# Patient Record
Sex: FEMALE | Race: WHITE | Employment: FULL TIME | URBAN - METROPOLITAN AREA
[De-identification: names, ages, dates, MRNs, and addresses within clinical notes are randomized per-mention and may not be internally consistent; named-entity substitution may affect disease eponyms.]

---

## 2018-09-26 ENCOUNTER — OFFICE VISIT (OUTPATIENT)
Dept: FAMILY MEDICINE CLINIC | Age: 33
End: 2018-09-26

## 2018-09-26 VITALS
WEIGHT: 124.8 LBS | TEMPERATURE: 98.3 F | BODY MASS INDEX: 22.97 KG/M2 | DIASTOLIC BLOOD PRESSURE: 66 MMHG | HEART RATE: 66 BPM | RESPIRATION RATE: 12 BRPM | HEIGHT: 62 IN | SYSTOLIC BLOOD PRESSURE: 99 MMHG | OXYGEN SATURATION: 97 %

## 2018-09-26 DIAGNOSIS — D49.7 OPTIC NERVE TUMOR: ICD-10-CM

## 2018-09-26 DIAGNOSIS — G44.219 FREQUENT EPISODIC TENSION-TYPE HEADACHE: Primary | ICD-10-CM

## 2018-09-26 PROBLEM — L70.9 ACNE: Status: ACTIVE | Noted: 2018-09-26

## 2018-09-26 RX ORDER — NAPROXEN SODIUM 220 MG
440 TABLET ORAL 2 TIMES DAILY WITH MEALS
Qty: 30 TAB | Refills: 0
Start: 2018-09-26 | End: 2022-08-05

## 2018-09-26 RX ORDER — IBUPROFEN 200 MG
TABLET ORAL
COMMUNITY
End: 2018-09-26 | Stop reason: ALTCHOICE

## 2018-09-26 RX ORDER — SPIRONOLACTONE 100 MG/1
TABLET, FILM COATED ORAL DAILY
COMMUNITY
End: 2022-08-05

## 2018-09-26 NOTE — PATIENT INSTRUCTIONS
Tension Headache: Care Instructions  Your Care Instructions  Most headaches are tension headaches. These headaches tend to happen again, especially if you are under stress. A tension headache may cause pain or a feeling of pressure all over your head. You probably can't pinpoint the center of the pain. If you keep getting tension headaches, the best thing you can do to limit them is to find out what is causing them and then make changes in those areas. Follow-up care is a key part of your treatment and safety. Be sure to make and go to all appointments, and call your doctor if you are having problems. It's also a good idea to know your test results and keep a list of the medicines you take. How can you care for yourself at home? · Rest in a quiet, dark room with a cool cloth on your forehead until your headache is gone. Close your eyes, and try to relax or go to sleep. Don't watch TV or read. Avoid using the computer. · Use a warm, moist towel or a heating pad set on low to relax tight shoulder and neck muscles. · Have someone gently massage your neck and shoulders. · Take pain medicines exactly as directed. ¨ If the doctor gave you a prescription medicine for pain, take it as prescribed. ¨ If you are not taking a prescription pain medicine, ask your doctor if you can take an over-the-counter medicine. · Be careful not to take pain medicine more often than the instructions allow, because you may get worse or more frequent headaches when the medicine wears off. · If you get another tension headache, stop what you are doing and sit quietly for a moment. Close your eyes and breathe slowly. Try to relax your head and neck muscles. · Do not ignore new symptoms that occur with a headache, such as fever, weakness or numbness, vision changes, or confusion. These may be signs of a more serious problem. To help prevent headaches  · Keep a headache diary so you can figure out what triggers your headaches. Avoiding triggers may help you prevent headaches. Record when each headache began, how long it lasted, and what the pain was like (throbbing, aching, stabbing, or dull). List anything that may have triggered the headache, such as being physically or emotionally stressed or being anxious or depressed. Other possible triggers are hunger, anger, fatigue, poor posture, and muscle strain. · Find healthy ways to deal with stress. Headaches are most common during or right after stressful times. Take time to relax before and after you do something that has caused a headache in the past.  · Exercise daily to relieve stress. Relaxation exercises may help reduce tension. · Get plenty of sleep. · Eat regularly and well. Long periods without food can trigger a headache. · Treat yourself to a massage. Some people find that massages are very helpful in relieving tension. · Try to keep your muscles relaxed by keeping good posture. Check your jaw, face, neck, and shoulder muscles for tension, and try to relax them. When sitting at a desk, change positions often, and stretch for 30 seconds each hour. · Reduce eyestrain from computers by blinking frequently and looking away from the computer screen every so often. Make sure you have proper eyewear and that your monitor is set up properly, about an arm's length away. When should you call for help? Call 911 anytime you think you may need emergency care. For example, call if:    · You have signs of a stroke. These may include:  ¨ Sudden numbness, paralysis, or weakness in your face, arm, or leg, especially on only one side of your body. ¨ Sudden vision changes. ¨ Sudden trouble speaking. ¨ Sudden confusion or trouble understanding simple statements. ¨ Sudden problems with walking or balance.   ¨ A sudden, severe headache that is different from past headaches.    Call your doctor now or seek immediate medical care if:    · You have new or worse nausea and vomiting.     · You have a new or higher fever.     · Your headache gets much worse.    Watch closely for changes in your health, and be sure to contact your doctor if:    · You are not getting better after 2 days (48 hours). Where can you learn more? Go to http://suzan-miki.info/. Enter 84 17 32 in the search box to learn more about \"Tension Headache: Care Instructions. \"  Current as of: October 9, 2017  Content Version: 11.7  © 9353-7758 [employer]. Care instructions adapted under license by We[employer] (which disclaims liability or warranty for this information). If you have questions about a medical condition or this instruction, always ask your healthcare professional. [place] [address] any warranty or liability for your use of this information. Neck: Exercises  Your Care Instructions  Here are some examples of typical rehabilitation exercises for your condition. Start each exercise slowly. Ease off the exercise if you start to have pain. Your doctor or physical therapist will tell you when you can start these exercises and which ones will work best for you. How to do the exercises  Neck stretch    1. This stretch works best if you keep your shoulder down as you lean away from it. To help you remember to do this, start by relaxing your shoulders and lightly holding on to your thighs or your chair. 2. Tilt your head toward your shoulder and hold for 15 to 30 seconds. Let the weight of your head stretch your muscles. 3. If you would like a little added stretch, use your hand to gently and steadily pull your head toward your shoulder. For example, keeping your right shoulder down, lean your head to the left. 4. Repeat 2 to 4 times toward each shoulder. Diagonal neck stretch    1. Turn your head slightly toward the direction you will be stretching, and tilt your head diagonally toward your chest and hold for 15 to 30 seconds.   2. If you would like a little added stretch, use your hand to gently and steadily pull your head forward on the diagonal.  3. Repeat 2 to 4 times toward each side. Dorsal glide stretch    The dorsal glide stretches the back of the neck. If you feel pain, do not glide so far back. Some people find this exercise easier to do while lying on their backs with an ice pack on the neck. 1. Sit or stand tall and look straight ahead. 2. Slowly tuck your chin as you glide your head backward over your body  3. Hold for a count of 6, and then relax for up to 10 seconds. 4. Repeat 8 to 12 times. Chest and shoulder stretch    1. Sit or stand tall and glide your head backward as in the dorsal glide stretch. 2. Raise both arms so that your hands are next to your ears. 3. Take a deep breath, and as you breathe out, lower your elbows down and behind your back. You will feel your shoulder blades slide down and together, and at the same time you will feel a stretch across your chest and the front of your shoulders. 4. Hold for about 6 seconds, and then relax for up to 10 seconds. 5. Repeat 8 to 12 times. Strengthening: Hands on head    1. Move your head backward, forward, and side to side against gentle pressure from your hands, holding each position for about 6 seconds. 2. Repeat 8 to 12 times. Follow-up care is a key part of your treatment and safety. Be sure to make and go to all appointments, and call your doctor if you are having problems. It's also a good idea to know your test results and keep a list of the medicines you take. Where can you learn more? Go to http://suzan-miki.info/. Enter P975 in the search box to learn more about \"Neck: Exercises. \"  Current as of: November 29, 2017  Content Version: 11.7  © 7851-6713 [employer]. Care instructions adapted under license by [employer]Barn (which disclaims liability or warranty for this information).  If you have questions about a medical condition or this instruction, always ask your healthcare professional. [place] [address] any warranty or liability for your use of this information.

## 2018-09-26 NOTE — MR AVS SNAPSHOT
[address] [place] [address] [phone] 
[phone] Patient: Jenelle Ho MRN: [id number] :1985 Visit Information Date & Time Provider Department Dept. Phone Encounter #  
 2018  1:45 PM Fatmata Garcia, [address] [facility] [phone] [id number] Follow-up Instructions Return in about 2 weeks (around 10/10/2018) for with MRI results. Upcoming Health Maintenance Date Due DTaP/Tdap/Td series (1 - Tdap) 10/22/2006 PAP AKA CERVICAL CYTOLOGY 10/22/2006 Influenza Age 5 to Adult 2018 Allergies as of 2018  Review Complete On: 2018 By: AFSHIN Grissom Severity Noted Reaction Type Reactions Penicillins High 2018    Anaphylaxis, Rash Current Immunizations  Never Reviewed No immunizations on file. Not reviewed this visit You Were Diagnosed With   
  
 Codes Comments Frequent episodic tension-type headache    -  Primary ICD-10-CM: F08.023 ICD-9-CM: 339.11 Optic nerve tumor     ICD-10-CM: D49.7 ICD-9-CM: 239.7 Vitals BP Pulse Temp Resp Height(growth percentile) Weight(growth percentile) 99/66 (BP 1 Location: Right arm, BP Patient Position: Sitting) 66 98.3 °F (36.8 °C) (Oral) 12 5' 2\" (1.575 m) 124 lb 12.8 oz (56.6 kg) SpO2 BMI OB Status Smoking Status 97% 22.83 kg/m2 IUD Never Smoker Vitals History BMI and BSA Data Body Mass Index Body Surface Area  
 22.83 kg/m 2 1.57 m 2 Preferred Pharmacy Pharmacy Name Phone [id number], [address] [place] [address] Mika Specter [phone] Your Updated Medication List  
  
   
This list is accurate as of 18  2:35 PM.  Always use your most recent med list.  
  
  
  
  
 levonorgestrel 20 mcg/24 hr (5 years) Iud  
Commonly known as:  MIRENA  
by Intrauterine route. naproxen sodium 220 mg tablet Commonly known as:  Coralee Flavors  
 Take 2 Tabs by mouth two (2) times daily (with meals). Take as needed for headache.  
  
 spironolactone 100 mg tablet Commonly known as:  ALDACTONE Take  by mouth daily. Follow-up Instructions Return in about 2 weeks (around 10/10/2018) for with MRI results. To-Do List   
 09/26/2018 Imaging:  MRI BRAIN W WO CONT Patient Instructions Tension Headache: Care Instructions Your Care Instructions Most headaches are tension headaches. These headaches tend to happen again, especially if you are under stress. A tension headache may cause pain or a feeling of pressure all over your head. You probably can't pinpoint the center of the pain. If you keep getting tension headaches, the best thing you can do to limit them is to find out what is causing them and then make changes in those areas. Follow-up care is a key part of your treatment and safety. Be sure to make and go to all appointments, and call your doctor if you are having problems. It's also a good idea to know your test results and keep a list of the medicines you take. How can you care for yourself at home? · Rest in a quiet, dark room with a cool cloth on your forehead until your headache is gone. Close your eyes, and try to relax or go to sleep. Don't watch TV or read. Avoid using the computer. · Use a warm, moist towel or a heating pad set on low to relax tight shoulder and neck muscles. · Have someone gently massage your neck and shoulders. · Take pain medicines exactly as directed. ¨ If the doctor gave you a prescription medicine for pain, take it as prescribed. ¨ If you are not taking a prescription pain medicine, ask your doctor if you can take an over-the-counter medicine. · Be careful not to take pain medicine more often than the instructions allow, because you may get worse or more frequent headaches when the medicine wears off. · If you get another tension headache, stop what you are doing and sit quietly for a moment. Close your eyes and breathe slowly. Try to relax your head and neck muscles. · Do not ignore new symptoms that occur with a headache, such as fever, weakness or numbness, vision changes, or confusion. These may be signs of a more serious problem. To help prevent headaches · Keep a headache diary so you can figure out what triggers your headaches. Avoiding triggers may help you prevent headaches. Record when each headache began, how long it lasted, and what the pain was like (throbbing, aching, stabbing, or dull). List anything that may have triggered the headache, such as being physically or emotionally stressed or being anxious or depressed. Other possible triggers are hunger, anger, fatigue, poor posture, and muscle strain. · Find healthy ways to deal with stress. Headaches are most common during or right after stressful times. Take time to relax before and after you do something that has caused a headache in the past. 
· Exercise daily to relieve stress. Relaxation exercises may help reduce tension. · Get plenty of sleep. · Eat regularly and well. Long periods without food can trigger a headache. · Treat yourself to a massage. Some people find that massages are very helpful in relieving tension. · Try to keep your muscles relaxed by keeping good posture. Check your jaw, face, neck, and shoulder muscles for tension, and try to relax them. When sitting at a desk, change positions often, and stretch for 30 seconds each hour. · Reduce eyestrain from computers by blinking frequently and looking away from the computer screen every so often. Make sure you have proper eyewear and that your monitor is set up properly, about an arm's length away. When should you call for help? Call 911 anytime you think you may need emergency care. For example, call if: 
  · You have signs of a stroke. These may include: ¨ Sudden numbness, paralysis, or weakness in your face, arm, or leg, especially on only one side of your body. ¨ Sudden vision changes. ¨ Sudden trouble speaking. ¨ Sudden confusion or trouble understanding simple statements. ¨ Sudden problems with walking or balance. ¨ A sudden, severe headache that is different from past headaches.  
 Call your doctor now or seek immediate medical care if: 
  · You have new or worse nausea and vomiting.  
  · You have a new or higher fever.  
  · Your headache gets much worse.  
 Watch closely for changes in your health, and be sure to contact your doctor if: 
  · You are not getting better after 2 days (48 hours). Where can you learn more? Go to http://suzanSphere 3dmiki.info/. Enter 84 17 24 in the search box to learn more about \"Tension Headache: Care Instructions. \" Current as of: October 9, 2017 Content Version: 11.7 © 3135-1336 [employer]ecent. Care instructions adapted under license by CrossReader (which disclaims liability or warranty for this information). If you have questions about a medical condition or this instruction, always ask your healthcare professional. [place] [address] any warranty or liability for your use of this information. Neck: Exercises Your Care Instructions Here are some examples of typical rehabilitation exercises for your condition. Start each exercise slowly. Ease off the exercise if you start to have pain. Your doctor or physical therapist will tell you when you can start these exercises and which ones will work best for you. How to do the exercises Neck stretch 1. This stretch works best if you keep your shoulder down as you lean away from it. To help you remember to do this, start by relaxing your shoulders and lightly holding on to your thighs or your chair. 2. Tilt your head toward your shoulder and hold for 15 to 30 seconds.  Let the weight of your head stretch your muscles. 3. If you would like a little added stretch, use your hand to gently and steadily pull your head toward your shoulder. For example, keeping your right shoulder down, lean your head to the left. 4. Repeat 2 to 4 times toward each shoulder. Diagonal neck stretch 1. Turn your head slightly toward the direction you will be stretching, and tilt your head diagonally toward your chest and hold for 15 to 30 seconds. 2. If you would like a little added stretch, use your hand to gently and steadily pull your head forward on the diagonal. 
3. Repeat 2 to 4 times toward each side. Dorsal glide stretch The dorsal glide stretches the back of the neck. If you feel pain, do not glide so far back. Some people find this exercise easier to do while lying on their backs with an ice pack on the neck. 1. Sit or stand tall and look straight ahead. 2. Slowly tuck your chin as you glide your head backward over your body 3. Hold for a count of 6, and then relax for up to 10 seconds. 4. Repeat 8 to 12 times. Chest and shoulder stretch 1. Sit or stand tall and glide your head backward as in the dorsal glide stretch. 2. Raise both arms so that your hands are next to your ears. 3. Take a deep breath, and as you breathe out, lower your elbows down and behind your back. You will feel your shoulder blades slide down and together, and at the same time you will feel a stretch across your chest and the front of your shoulders. 4. Hold for about 6 seconds, and then relax for up to 10 seconds. 5. Repeat 8 to 12 times. Strengthening: Hands on head 1. Move your head backward, forward, and side to side against gentle pressure from your hands, holding each position for about 6 seconds. 2. Repeat 8 to 12 times. Follow-up care is a key part of your treatment and safety.  Be sure to make and go to all appointments, and call your doctor if you are having problems. It's also a good idea to know your test results and keep a list of the medicines you take. Where can you learn more? Go to http://suzan-miki.info/. Enter P975 in the search box to learn more about \"Neck: Exercises. \" Current as of: November 29, 2017 Content Version: 11.7 © 5739-4561 [employer]illow[employer] (formerly [employer]). Care instructions adapted under license by Linqia (which disclaims liability or warranty for this information). If you have questions about a medical condition or this instruction, always ask your healthcare professional. [address] any warranty or liability for your use of this information. Introducing [facility]! [employer] introduces Fileblaze patient portal. Now you can access parts of your medical record, email your doctor's office, and request medication refills online. 1. In your internet browser, go to [email or web address] 2. Click on the First Time User? Click Here link in the Sign In box. You will see the New Member Sign Up page. 3. Enter your Fileblaze Access Code exactly as it appears below. You will not need to use this code after youve completed the sign-up process. If you do not sign up before the expiration date, you must request a new code. · Fileblaze Access Code: [email or web address] Expires: 12/25/2018  2:32 PM 
 
4. Enter the last four digits of your Social Security Number (xxxx) and Date of Birth (mm/dd/yyyy) as indicated and click Submit. You will be taken to the next sign-up page. 5. Create a Audax Medicalt ID. This will be your Fileblaze login ID and cannot be changed, so think of one that is secure and easy to remember. 6. Create a Fileblaze password. You can change your password at any time. 7. Enter your Password Reset Question and Answer. This can be used at a later time if you forget your password. 8. Enter your e-mail address.  You will receive e-mail notification when new information is available in VersionOne. 9. Click Sign Up. You can now view and download portions of your medical record. 10. Click the Download Summary menu link to download a portable copy of your medical information. If you have questions, please visit the Frequently Asked Questions section of the VersionOne website. Remember, VersionOne is NOT to be used for urgent needs. For medical emergencies, dial 911. Now available from your iPhone and Android! Please provide this summary of care documentation to your next provider. If you have any questions after today's visit, please call [phone].

## 2018-09-26 NOTE — PROGRESS NOTES
HISTORY OF PRESENT ILLNESS  Aurelio Chance is a 28 y.o. female. HPI  Ms. Rodrigo Heck presents for ~1 month of near-daily headaches. Headaches are frontal and bilateral temporal. They tend to begin mid-day or in the afternoon and persist until bedtime. She wakes headache-free. She admits to stress as a trigger and denies worsening with physical activity. She admits to a significant increase in her work stress as of late. Her most severe headache was Monday, 2 days ago. She admits to light sensitivity with this headache but denies it otherwise. She has been taking OTC Ibuprofen 800 mg at least 3 or 4 times per week but reports this to not be helping. Significantly, she has a personal hx of an optic nerve tumor, diagnosed in 2006. This resolved over time with medication. She last had an MRI in 2015 that was reportedly negative. She saw a neurologist in [place] at that time. Fam hx significant for her mother and grandmother having migraine headaches. No personal hx of migraines. Review of Systems   Eyes: Positive for photophobia (1x). Negative for blurred vision and double vision. Gastrointestinal: Negative for nausea and vomiting. Musculoskeletal: Negative for neck pain. Neurological: Positive for headaches. Negative for dizziness. Psychiatric/Behavioral:        Admits to increased stress. Visit Vitals    BP 99/66 (BP 1 Location: Right arm, BP Patient Position: Sitting)    Pulse 66    Temp 98.3 °F (36.8 °C) (Oral)    Resp 12    Ht 5' 2\" (1.575 m)    Wt 124 lb 12.8 oz (56.6 kg)    SpO2 97%    BMI 22.83 kg/m2         Physical Exam   Constitutional: She is oriented to person, place, and time. She appears well-developed and well-nourished. No distress.    HENT:   Right Ear: External ear normal.   Left Ear: External ear normal.   Nose: Nose normal.   Mouth/Throat: Uvula is midline, oropharynx is clear and moist and mucous membranes are normal. No oropharyngeal exudate, posterior oropharyngeal edema, posterior oropharyngeal erythema or tonsillar abscesses. Eyes: Conjunctivae and EOM are normal. Pupils are equal, round, and reactive to light. No scleral icterus. Neck: Neck supple. Carotid bruit is not present. No thyromegaly present. Cardiovascular: Normal rate, regular rhythm and normal heart sounds. Exam reveals no gallop and no friction rub. No murmur heard. Pulses:       Dorsalis pedis pulses are 2+ on the right side, and 2+ on the left side. Posterior tibial pulses are 2+ on the right side, and 2+ on the left side. Pulmonary/Chest: Effort normal and breath sounds normal. No respiratory distress. She has no decreased breath sounds. She has no wheezes. She has no rhonchi. She has no rales. Abdominal: She exhibits no abdominal bruit. Musculoskeletal: She exhibits no edema. Right ankle: She exhibits no swelling. Left ankle: She exhibits no swelling. Lymphadenopathy:        Head (right side): No submandibular and no tonsillar adenopathy present. Head (left side): No submandibular and no tonsillar adenopathy present. She has no cervical adenopathy. Right: No supraclavicular adenopathy present. Left: No supraclavicular adenopathy present. Neurological: She is alert and oriented to person, place, and time. She has normal reflexes. She displays normal reflexes. No cranial nerve deficit. She exhibits normal muscle tone. Coordination normal.   Skin: Skin is warm and dry. Psychiatric: She has a normal mood and affect. Her behavior is normal.       ASSESSMENT and PLAN  Diagnoses and all orders for this visit:    1. Frequent episodic tension-type headache  -     MRI BRAIN W WO CONT; Future  - Given hx of optic nerve tumor, elect to repeat MRI Brain.   -     naproxen sodium (ALEVE) 220 mg tablet; Take 2 Tabs by mouth two (2) times daily (with meals). Take as needed for headache. - D/C Ibuprofen.  Concern of this complicating the headache picture with possible medication overuse headache. Okay to use prn Naproxen instead. - Stress is very likely the cause of her new headaches. Neck stretches/heating pad use advised and discussed. 2. Optic nerve tumor  -     MRI BRAIN W WO CONT; Future      Follow-up Disposition:  Return in about 2 weeks (around 10/10/2018) for with MRI results.

## 2018-09-26 NOTE — PROGRESS NOTES
Rm 8  Patient presents to the clinic complaining of a persistent headaches that started about x 1 month ago on he temples and center of forehead between eyes. Patient stated usually come on in the afternoon and stay until the next morning.

## 2022-01-18 LAB
ANTIBODY SCREEN, EXTERNAL: NEGATIVE
CHLAMYDIA, EXTERNAL: NEGATIVE
HBSAG, EXTERNAL: NEGATIVE
HEPATITIS C AB,   EXT: NEGATIVE
HIV, EXTERNAL: NON REACTIVE
N. GONORRHEA, EXTERNAL: NEGATIVE
RUBELLA, EXTERNAL: NORMAL
T. PALLIDUM, EXTERNAL: NON REACTIVE
TYPE, ABO & RH, EXTERNAL: NORMAL

## 2022-03-19 PROBLEM — G44.219 FREQUENT EPISODIC TENSION-TYPE HEADACHE: Status: ACTIVE | Noted: 2018-09-26

## 2022-03-20 PROBLEM — L70.9 ACNE: Status: ACTIVE | Noted: 2018-09-26

## 2022-07-22 LAB — GRBS, EXTERNAL: NEGATIVE

## 2022-08-01 ENCOUNTER — HOSPITAL ENCOUNTER (INPATIENT)
Age: 37
LOS: 4 days | Discharge: HOME | End: 2022-08-05
Attending: OBSTETRICS & GYNECOLOGY | Admitting: OBSTETRICS & GYNECOLOGY
Payer: COMMERCIAL

## 2022-08-01 PROBLEM — K83.1 CHOLESTASIS DURING PREGNANCY: Status: ACTIVE | Noted: 2022-08-01

## 2022-08-01 PROBLEM — O26.619 CHOLESTASIS DURING PREGNANCY: Status: ACTIVE | Noted: 2022-08-01

## 2022-08-01 PROBLEM — O26.649 CHOLESTASIS DURING PREGNANCY: Status: ACTIVE | Noted: 2022-08-01

## 2022-08-01 LAB
ALBUMIN SERPL-MCNC: 2.5 G/DL (ref 3.5–5)
ALBUMIN/GLOB SERPL: 0.8 {RATIO} (ref 1.1–2.2)
ALP SERPL-CCNC: 165 U/L (ref 45–117)
ALT SERPL-CCNC: 21 U/L (ref 12–78)
ANION GAP SERPL CALC-SCNC: 8 MMOL/L (ref 5–15)
AST SERPL-CCNC: 19 U/L (ref 15–37)
BILIRUB SERPL-MCNC: 0.4 MG/DL (ref 0.2–1)
BUN SERPL-MCNC: 9 MG/DL (ref 6–20)
BUN/CREAT SERPL: 16 (ref 12–20)
CALCIUM SERPL-MCNC: 8.7 MG/DL (ref 8.5–10.1)
CHLORIDE SERPL-SCNC: 106 MMOL/L (ref 97–108)
CO2 SERPL-SCNC: 23 MMOL/L (ref 21–32)
CREAT SERPL-MCNC: 0.55 MG/DL (ref 0.55–1.02)
ERYTHROCYTE [DISTWIDTH] IN BLOOD BY AUTOMATED COUNT: 12.9 % (ref 11.5–14.5)
GLOBULIN SER CALC-MCNC: 3.3 G/DL (ref 2–4)
GLUCOSE SERPL-MCNC: 120 MG/DL (ref 65–100)
HCT VFR BLD AUTO: 31.8 % (ref 35–47)
HGB BLD-MCNC: 11.2 G/DL (ref 11.5–16)
MCH RBC QN AUTO: 30.7 PG (ref 26–34)
MCHC RBC AUTO-ENTMCNC: 35.2 G/DL (ref 30–36.5)
MCV RBC AUTO: 87.1 FL (ref 80–99)
NRBC # BLD: 0 K/UL (ref 0–0.01)
NRBC BLD-RTO: 0 PER 100 WBC
PLATELET # BLD AUTO: 217 K/UL (ref 150–400)
PMV BLD AUTO: 11.6 FL (ref 8.9–12.9)
POTASSIUM SERPL-SCNC: 3.6 MMOL/L (ref 3.5–5.1)
PROT SERPL-MCNC: 5.8 G/DL (ref 6.4–8.2)
RBC # BLD AUTO: 3.65 M/UL (ref 3.8–5.2)
SODIUM SERPL-SCNC: 137 MMOL/L (ref 136–145)
WBC # BLD AUTO: 10.6 K/UL (ref 3.6–11)

## 2022-08-01 PROCEDURE — 80053 COMPREHEN METABOLIC PANEL: CPT

## 2022-08-01 PROCEDURE — 3E033VJ INTRODUCTION OF OTHER HORMONE INTO PERIPHERAL VEIN, PERCUTANEOUS APPROACH: ICD-10-PCS | Performed by: OBSTETRICS & GYNECOLOGY

## 2022-08-01 PROCEDURE — 74011250637 HC RX REV CODE- 250/637: Performed by: OBSTETRICS & GYNECOLOGY

## 2022-08-01 PROCEDURE — 3E0DXGC INTRODUCTION OF OTHER THERAPEUTIC SUBSTANCE INTO MOUTH AND PHARYNX, EXTERNAL APPROACH: ICD-10-PCS | Performed by: OBSTETRICS & GYNECOLOGY

## 2022-08-01 PROCEDURE — 85027 COMPLETE CBC AUTOMATED: CPT

## 2022-08-01 PROCEDURE — 36415 COLL VENOUS BLD VENIPUNCTURE: CPT

## 2022-08-01 PROCEDURE — 65270000029 HC RM PRIVATE

## 2022-08-01 PROCEDURE — 94760 N-INVAS EAR/PLS OXIMETRY 1: CPT

## 2022-08-01 RX ORDER — ZOLPIDEM TARTRATE 5 MG/1
5 TABLET ORAL
Status: DISCONTINUED | OUTPATIENT
Start: 2022-08-01 | End: 2022-08-03

## 2022-08-01 RX ORDER — OXYTOCIN/RINGER'S LACTATE 30/500 ML
87.3 PLASTIC BAG, INJECTION (ML) INTRAVENOUS AS NEEDED
Status: DISCONTINUED | OUTPATIENT
Start: 2022-08-01 | End: 2022-08-05 | Stop reason: HOSPADM

## 2022-08-01 RX ORDER — URSODIOL 300 MG/1
300 CAPSULE ORAL
Status: DISCONTINUED | OUTPATIENT
Start: 2022-08-02 | End: 2022-08-02

## 2022-08-01 RX ORDER — ACETAMINOPHEN 325 MG/1
650 TABLET ORAL
Status: DISCONTINUED | OUTPATIENT
Start: 2022-08-01 | End: 2022-08-03

## 2022-08-01 RX ORDER — TERBUTALINE SULFATE 1 MG/ML
0.25 INJECTION SUBCUTANEOUS AS NEEDED
Status: DISCONTINUED | OUTPATIENT
Start: 2022-08-01 | End: 2022-08-03 | Stop reason: HOSPADM

## 2022-08-01 RX ORDER — FENTANYL CITRATE 50 UG/ML
25 INJECTION, SOLUTION INTRAMUSCULAR; INTRAVENOUS
Status: DISCONTINUED | OUTPATIENT
Start: 2022-08-01 | End: 2022-08-03 | Stop reason: HOSPADM

## 2022-08-01 RX ORDER — URSODIOL 300 MG/1
300 CAPSULE ORAL 3 TIMES DAILY
COMMUNITY
End: 2022-08-05

## 2022-08-01 RX ORDER — NALBUPHINE HYDROCHLORIDE 20 MG/ML
10 INJECTION, SOLUTION INTRAMUSCULAR; INTRAVENOUS; SUBCUTANEOUS
Status: DISCONTINUED | OUTPATIENT
Start: 2022-08-01 | End: 2022-08-03 | Stop reason: HOSPADM

## 2022-08-01 RX ORDER — OXYTOCIN/RINGER'S LACTATE 30/500 ML
0-20 PLASTIC BAG, INJECTION (ML) INTRAVENOUS
Status: DISCONTINUED | OUTPATIENT
Start: 2022-08-02 | End: 2022-08-03 | Stop reason: HOSPADM

## 2022-08-01 RX ORDER — ONDANSETRON 2 MG/ML
4 INJECTION INTRAMUSCULAR; INTRAVENOUS
Status: DISCONTINUED | OUTPATIENT
Start: 2022-08-01 | End: 2022-08-03 | Stop reason: HOSPADM

## 2022-08-01 RX ORDER — HYDROXYZINE PAMOATE 50 MG/1
50 CAPSULE ORAL
COMMUNITY
End: 2022-08-05

## 2022-08-01 RX ORDER — OXYTOCIN/RINGER'S LACTATE 30/500 ML
10 PLASTIC BAG, INJECTION (ML) INTRAVENOUS AS NEEDED
Status: COMPLETED | OUTPATIENT
Start: 2022-08-01 | End: 2022-08-03

## 2022-08-01 RX ADMIN — ACETAMINOPHEN 650 MG: 325 TABLET ORAL at 20:00

## 2022-08-01 RX ADMIN — ZOLPIDEM TARTRATE 5 MG: 5 TABLET ORAL at 21:01

## 2022-08-01 RX ADMIN — Medication 25 MCG: at 20:00

## 2022-08-01 NOTE — H&P
Bethhasmukh Gabriela vwcHistory & Physical    Name: Bonnie Cruz MRN: [id number]  SSN: [id number]    YOB: 1985  Age: 39 y.o. Sex: female      Subjective:     Estimated Date of Delivery: 8/23/2022. OB History   No obstetric history on file. Ms. Opal Romo is admitted with pregnancy at 40 6/11 weeks for induction of labor due to intrahepatic cholestasis of pregnancy. Prenatal course is complicated by:  -AMA: genetic testing declined, normal FS  -Intrahepatic cholestasis: diagnosed at 30 wks, last bile acids 7/7 27.4    She denies ctx, vb, lof. +FM. Itching manageable with ursodiol. Please see prenatal records for details. Past Medical History:   Diagnosis Date    Acne     Optic nerve tumor 2006    Resolved over time with medication. No past surgical history on file. Social History     Occupational History    Not on file   Tobacco Use    Smoking status: Never    Smokeless tobacco: Never   Substance and Sexual Activity    Alcohol use: No    Drug use: No    Sexual activity: Yes     Family History   Problem Relation Age of Onset    Hypertension Father        Allergies   Allergen Reactions    Penicillins Anaphylaxis and Rash     Prior to Admission medications    Medication Sig Start Date End Date Taking? Authorizing Provider   spironolactone (ALDACTONE) 100 mg tablet Take  by mouth daily. Provider, Historical   levonorgestrel (MIRENA) 20 mcg/24 hr (5 years) IUD by Intrauterine route. Provider, Historical   naproxen sodium (ALEVE) 220 mg tablet Take 2 Tabs by mouth two (2) times daily (with meals). Take as needed for headache. 9/26/18   AFSHIN Ortiz        Review of Systems: A comprehensive review of systems was negative except for that written in the History of Present Illness. Objective:     Vitals: There were no vitals filed for this visit. 112/78 in office    Physical Exam:  Patient without distress.   Lung: normal respiratory effort  Abdomen: soft, nontender  Fundus: soft and non tender  Membranes:  Intact  Fetal Heart Rate: Reactive          Prenatal Labs:   No results found for: ABORH, RUBELLAEXT, HBSAGEXT, HIVEXT, RPREXT, GONNOEXT, CHLAMEXT, ABORHEXT, RUBELLAEXT, GRBSEXT, HBSAGEXT, HIVEXT, RPREXT, GONNOEXT, CHLAMEXT    A+  RI  GBS neg  COVID vaccinated/boosted  Bile acids  27.4, CMP wnl    Impression/Plan:     Active Problems:    * No active hospital problems. *       38 y/o  with IUP At 36 6/7 wks, IOL for intrahepatic cholestasis.    -Admit to L&D  -Routine admit labs+CMP  -Cook +/- cytotec overnight for cervical ripening per daniel doc discretion  -CEFM  -Plan pit in am

## 2022-08-01 NOTE — PROGRESS NOTES
Agree with Dr. Libby Ng H&P. Pt seen, examined and chart reviewed. Visit Vitals  /72   Pulse 83   Temp 97.6 °F (36.4 °C)   Resp 16   Wt 82.6 kg (182 lb)   SpO2 98%   BMI 33.29 kg/m²     An NST was performed and was reactive. The baseline FHR was 145. Moderate baseline  variability was noted. Accelerations of sufficient amplitude and duration were noted. There were no decelerations noted. [place] - none    Gen - NAD  Resp - nl effort  Abd - gravid, non-tender  Cervix - 1/70/-3    A/P: G1 at 36w6d presents for scheduled IOL for cholestasis.   GBS neg.    - reviewed recommendation for cervical ripening balloon vs. cytotec for cervical ripening, despite recommending balloon as more effective option, pt declines and prefers to proceed with cytotec overnight and then evaluate cervix in am for next steps  - discussed IOL expectations and procedure

## 2022-08-02 PROCEDURE — 74011250636 HC RX REV CODE- 250/636: Performed by: OBSTETRICS & GYNECOLOGY

## 2022-08-02 PROCEDURE — 74011250637 HC RX REV CODE- 250/637: Performed by: OBSTETRICS & GYNECOLOGY

## 2022-08-02 PROCEDURE — 65270000029 HC RM PRIVATE

## 2022-08-02 PROCEDURE — 74011250637 HC RX REV CODE- 250/637: Performed by: NURSE PRACTITIONER

## 2022-08-02 RX ORDER — DIPHENHYDRAMINE HCL 25 MG
25 CAPSULE ORAL ONCE
Status: COMPLETED | OUTPATIENT
Start: 2022-08-02 | End: 2022-08-02

## 2022-08-02 RX ADMIN — SODIUM CHLORIDE, POTASSIUM CHLORIDE, SODIUM LACTATE AND CALCIUM CHLORIDE 500 ML: 600; 310; 30; 20 INJECTION, SOLUTION INTRAVENOUS at 05:40

## 2022-08-02 RX ADMIN — NALBUPHINE HYDROCHLORIDE 5 MG: 20 INJECTION, SOLUTION INTRAMUSCULAR; INTRAVENOUS; SUBCUTANEOUS at 06:40

## 2022-08-02 RX ADMIN — Medication 25 MCG: at 00:15

## 2022-08-02 RX ADMIN — NALBUPHINE HYDROCHLORIDE 10 MG: 20 INJECTION, SOLUTION INTRAMUSCULAR; INTRAVENOUS; SUBCUTANEOUS at 11:52

## 2022-08-02 RX ADMIN — FENTANYL CITRATE 25 MCG: 50 INJECTION, SOLUTION INTRAMUSCULAR; INTRAVENOUS at 09:54

## 2022-08-02 RX ADMIN — Medication 25 MCG: at 04:17

## 2022-08-02 RX ADMIN — FENTANYL CITRATE 25 MCG: 50 INJECTION, SOLUTION INTRAMUSCULAR; INTRAVENOUS at 10:06

## 2022-08-02 RX ADMIN — DIPHENHYDRAMINE HYDROCHLORIDE 25 MG: 25 CAPSULE ORAL at 22:49

## 2022-08-02 RX ADMIN — NALBUPHINE HYDROCHLORIDE 10 MG: 20 INJECTION, SOLUTION INTRAMUSCULAR; INTRAVENOUS; SUBCUTANEOUS at 22:21

## 2022-08-02 RX ADMIN — DIPHENHYDRAMINE HYDROCHLORIDE 25 MG: 25 CAPSULE ORAL at 16:20

## 2022-08-02 NOTE — PROGRESS NOTES
Bedside and Verbal shift change report given to JÚNIOR Neal RN (oncoming nurse) by Janell Patrick RN (offgoing nurse). Report included the following information SBAR, Kardex, Intake/Output, MAR, Accordion, and Recent Results. 0730- Bedside and Verbal shift change report given to Natacha Reinoso RN (oncoming nurse) by JÚNIOR Neal RN (offgoing nurse). Report included the following information SBAR, Kardex, Intake/Output, MAR, Accordion, and Recent Results.

## 2022-08-02 NOTE — PROGRESS NOTES
50mcg of fentanyl administered prior to [facility] catheter placement per pt request.  [facility] catheter placed manually into the cervix and 60/50 of saline placed into balloons. Reassuring fetal monitoring throughout.     Eugene Sorto MD

## 2022-08-02 NOTE — PROGRESS NOTES
Doing well. Active FM. No complaints. Rare contraction. No leakage of fluid or vaginal bleeding. Visit Vitals  /75   Pulse 66   Temp (P) 97.5 °F (36.4 °C)   Resp (P) 16   Wt 82.6 kg (182 lb)   SpO2 98%   BMI 33.29 kg/m²     Patient Vitals for the past 4 hrs:    Mode Fetal Heart Rate Fetal Activity Variability Decelerations Accelerations RN Reviewed Strip?   08/02/22 0730 External 135 -- 6-25 BPM None No Yes   08/02/22 0700 External 140 -- 6-25 BPM None Yes Yes   08/02/22 0630 External 140 -- 6-25 BPM None Yes Yes   08/02/22 0600 External 135 -- 6-25 BPM None Yes Yes   08/02/22 0530 External 135 (P) Present 6-25 BPM None Yes Yes     Category 1 fetal heart tracing  Contractions irregular, every 8-12 minutes    Cervix: 3GW/26%/-5, cephalic, intact      G1 37 0/7 IOL for cholestasis    - s/p cytotec x 3 overnight with minimal cervical change; discussed doing Cook catheter with pain medicine and pt agrees; she would like to shower first then will place Pat and give last dose of cyctotec; plan to transition to pitocin once contractions from last dose of cytotec space to less than 3 in a 10 minute period  - GBS neg  - Holly Suarez MD

## 2022-08-02 NOTE — PROGRESS NOTES
Labor Progress Note    S: Patient seen, fetal heart rate and contraction pattern evaluated. Resting comfortably in bed. Partner at bedside.      Physical Exam:  Patient Vitals for the past 4 hrs:   Temp Pulse Resp BP   22 98.7 °F (37.1 °C) 84 16 (!) 110/59         Cervical Exam: deferred  Membranes:  Intact  Uterine Contractions:  Frequency: Irregular, mild  Fetal Heart Rate: Reactive, 135s, accels present, decels absent, moderate variability      Assessment/Plan:    39 y.o.  at 36w6d IUP  IOL Cholestasis  Cat 1 tracing  GBS negative    P:  Assumed care of patient  Introduced self to patient/partner  Continue Cytotec overnight and plan per Dr. Gabby Mayfield  All questions asked/answered and patient/partner agree with plan    Sophia Alvarenga CNM

## 2022-08-02 NOTE — PROGRESS NOTES
0730-Bedside and Verbal shift change report given to Mario Dior RN (oncoming nurse) by Marshall Muse RN (offgoing nurse). Report included the following information SBAR, Kardex, MAR, and Recent Results. Bella Anderson at bedside SVE 1/50/-3. Plan for gillespie balloon. 0842-Patient off the monitor to shower before  0927- patient back on monitor  1000- Dr Rick Anderson at bedside, cook catether placed 60/50  1135- patient walking in the halls breathing through contractions. 1152- nubain given patient resting in bed.  at bedside  1935-Bedside and Verbal shift change report given to Marshall Muse RN (oncoming nurse) by Mario Dior RN (offgoing nurse). Report included the following information SBAR, Kardex, MAR, and Recent Results.

## 2022-08-03 ENCOUNTER — ANESTHESIA (OUTPATIENT)
Dept: LABOR AND DELIVERY | Age: 37
End: 2022-08-03
Payer: COMMERCIAL

## 2022-08-03 ENCOUNTER — ANESTHESIA EVENT (OUTPATIENT)
Dept: LABOR AND DELIVERY | Age: 37
End: 2022-08-03
Payer: COMMERCIAL

## 2022-08-03 PROCEDURE — 76060000078 HC EPIDURAL ANESTHESIA

## 2022-08-03 PROCEDURE — 00HU33Z INSERTION OF INFUSION DEVICE INTO SPINAL CANAL, PERCUTANEOUS APPROACH: ICD-10-PCS | Performed by: ANESTHESIOLOGY

## 2022-08-03 PROCEDURE — 65410000002 HC RM PRIVATE OB

## 2022-08-03 PROCEDURE — 75410000000 HC DELIVERY VAGINAL/SINGLE

## 2022-08-03 PROCEDURE — 74011250637 HC RX REV CODE- 250/637: Performed by: OBSTETRICS & GYNECOLOGY

## 2022-08-03 PROCEDURE — 74011250636 HC RX REV CODE- 250/636: Performed by: OBSTETRICS & GYNECOLOGY

## 2022-08-03 PROCEDURE — 74011250636 HC RX REV CODE- 250/636: Performed by: ANESTHESIOLOGY

## 2022-08-03 PROCEDURE — 74011000250 HC RX REV CODE- 250: Performed by: ANESTHESIOLOGY

## 2022-08-03 PROCEDURE — 10907ZC DRAINAGE OF AMNIOTIC FLUID, THERAPEUTIC FROM PRODUCTS OF CONCEPTION, VIA NATURAL OR ARTIFICIAL OPENING: ICD-10-PCS | Performed by: OBSTETRICS & GYNECOLOGY

## 2022-08-03 PROCEDURE — 77030014125 HC TY EPDRL BBMI -B: Performed by: ANESTHESIOLOGY

## 2022-08-03 PROCEDURE — 74011250637 HC RX REV CODE- 250/637: Performed by: NURSE PRACTITIONER

## 2022-08-03 PROCEDURE — 59200 INSERT CERVICAL DILATOR: CPT

## 2022-08-03 PROCEDURE — 0KQM0ZZ REPAIR PERINEUM MUSCLE, OPEN APPROACH: ICD-10-PCS | Performed by: OBSTETRICS & GYNECOLOGY

## 2022-08-03 PROCEDURE — 75410000002 HC LABOR FEE PER 1 HR

## 2022-08-03 PROCEDURE — 75410000003 HC RECOV DEL/VAG/CSECN EA 0.5 HR

## 2022-08-03 RX ORDER — BUPIVACAINE HYDROCHLORIDE 5 MG/ML
INJECTION, SOLUTION EPIDURAL; INTRACAUDAL AS NEEDED
Status: DISCONTINUED | OUTPATIENT
Start: 2022-08-03 | End: 2022-08-03 | Stop reason: HOSPADM

## 2022-08-03 RX ORDER — NALOXONE HYDROCHLORIDE 0.4 MG/ML
0.4 INJECTION, SOLUTION INTRAMUSCULAR; INTRAVENOUS; SUBCUTANEOUS AS NEEDED
Status: DISCONTINUED | OUTPATIENT
Start: 2022-08-03 | End: 2022-08-03 | Stop reason: HOSPADM

## 2022-08-03 RX ORDER — SODIUM CHLORIDE 0.9 % (FLUSH) 0.9 %
5-40 SYRINGE (ML) INJECTION EVERY 8 HOURS
Status: DISCONTINUED | OUTPATIENT
Start: 2022-08-03 | End: 2022-08-05 | Stop reason: HOSPADM

## 2022-08-03 RX ORDER — FENTANYL CITRATE 50 UG/ML
INJECTION, SOLUTION INTRAMUSCULAR; INTRAVENOUS AS NEEDED
Status: DISCONTINUED | OUTPATIENT
Start: 2022-08-03 | End: 2022-08-03 | Stop reason: HOSPADM

## 2022-08-03 RX ORDER — OXYTOCIN/RINGER'S LACTATE 30/500 ML
87.3 PLASTIC BAG, INJECTION (ML) INTRAVENOUS AS NEEDED
Status: COMPLETED | OUTPATIENT
Start: 2022-08-03 | End: 2022-08-03

## 2022-08-03 RX ORDER — OXYTOCIN/RINGER'S LACTATE 30/500 ML
10 PLASTIC BAG, INJECTION (ML) INTRAVENOUS AS NEEDED
Status: DISCONTINUED | OUTPATIENT
Start: 2022-08-03 | End: 2022-08-05 | Stop reason: HOSPADM

## 2022-08-03 RX ORDER — HYDROCORTISONE 1 %
CREAM (GRAM) TOPICAL AS NEEDED
Status: DISCONTINUED | OUTPATIENT
Start: 2022-08-03 | End: 2022-08-05 | Stop reason: HOSPADM

## 2022-08-03 RX ORDER — OXYCODONE HYDROCHLORIDE 5 MG/1
10 TABLET ORAL
Status: DISCONTINUED | OUTPATIENT
Start: 2022-08-03 | End: 2022-08-05 | Stop reason: HOSPADM

## 2022-08-03 RX ORDER — FENTANYL CITRATE 50 UG/ML
100 INJECTION, SOLUTION INTRAMUSCULAR; INTRAVENOUS ONCE
Status: DISCONTINUED | OUTPATIENT
Start: 2022-08-03 | End: 2022-08-03 | Stop reason: HOSPADM

## 2022-08-03 RX ORDER — DIPHENHYDRAMINE HCL 25 MG
25 CAPSULE ORAL
Status: DISCONTINUED | OUTPATIENT
Start: 2022-08-03 | End: 2022-08-05 | Stop reason: HOSPADM

## 2022-08-03 RX ORDER — BUPIVACAINE HYDROCHLORIDE 5 MG/ML
30 INJECTION, SOLUTION EPIDURAL; INTRACAUDAL AS NEEDED
Status: DISCONTINUED | OUTPATIENT
Start: 2022-08-03 | End: 2022-08-03 | Stop reason: HOSPADM

## 2022-08-03 RX ORDER — CHLOROPROCAINE HYDROCHLORIDE 30 MG/ML
INJECTION, SOLUTION EPIDURAL; INFILTRATION; INTRACAUDAL; PERINEURAL
Status: DISCONTINUED
Start: 2022-08-03 | End: 2022-08-03 | Stop reason: WASHOUT

## 2022-08-03 RX ORDER — OXYCODONE AND ACETAMINOPHEN 5; 325 MG/1; MG/1
2 TABLET ORAL
Status: DISCONTINUED | OUTPATIENT
Start: 2022-08-03 | End: 2022-08-03

## 2022-08-03 RX ORDER — OXYCODONE AND ACETAMINOPHEN 5; 325 MG/1; MG/1
1 TABLET ORAL
Status: DISCONTINUED | OUTPATIENT
Start: 2022-08-03 | End: 2022-08-03

## 2022-08-03 RX ORDER — NORETHINDRONE AND ETHINYL ESTRADIOL 0.5-0.035
10 KIT ORAL ONCE
Status: DISCONTINUED | OUTPATIENT
Start: 2022-08-03 | End: 2022-08-03 | Stop reason: HOSPADM

## 2022-08-03 RX ORDER — IBUPROFEN 600 MG/1
600 TABLET ORAL
Status: DISCONTINUED | OUTPATIENT
Start: 2022-08-03 | End: 2022-08-05 | Stop reason: HOSPADM

## 2022-08-03 RX ORDER — ONDANSETRON 4 MG/1
4 TABLET, ORALLY DISINTEGRATING ORAL
Status: DISCONTINUED | OUTPATIENT
Start: 2022-08-03 | End: 2022-08-05 | Stop reason: HOSPADM

## 2022-08-03 RX ORDER — CALCIUM CARBONATE 200(500)MG
200 TABLET,CHEWABLE ORAL
Status: DISCONTINUED | OUTPATIENT
Start: 2022-08-03 | End: 2022-08-05 | Stop reason: HOSPADM

## 2022-08-03 RX ORDER — ONDANSETRON 2 MG/ML
4 INJECTION INTRAMUSCULAR; INTRAVENOUS ONCE
Status: DISPENSED | OUTPATIENT
Start: 2022-08-03 | End: 2022-08-04

## 2022-08-03 RX ORDER — SIMETHICONE 80 MG
80 TABLET,CHEWABLE ORAL
Status: DISCONTINUED | OUTPATIENT
Start: 2022-08-03 | End: 2022-08-05 | Stop reason: HOSPADM

## 2022-08-03 RX ORDER — OXYCODONE HYDROCHLORIDE 5 MG/1
5 TABLET ORAL
Status: DISCONTINUED | OUTPATIENT
Start: 2022-08-03 | End: 2022-08-05 | Stop reason: HOSPADM

## 2022-08-03 RX ORDER — ACETAMINOPHEN 325 MG/1
650 TABLET ORAL
Status: DISCONTINUED | OUTPATIENT
Start: 2022-08-03 | End: 2022-08-05 | Stop reason: HOSPADM

## 2022-08-03 RX ORDER — LIDOCAINE HYDROCHLORIDE AND EPINEPHRINE 15; 5 MG/ML; UG/ML
INJECTION, SOLUTION EPIDURAL AS NEEDED
Status: DISCONTINUED | OUTPATIENT
Start: 2022-08-03 | End: 2022-08-03 | Stop reason: HOSPADM

## 2022-08-03 RX ORDER — FENTANYL/BUPIVACAINE/NS/PF 2-1250MCG
1-16 PREFILLED PUMP RESERVOIR EPIDURAL CONTINUOUS
Status: DISCONTINUED | OUTPATIENT
Start: 2022-08-03 | End: 2022-08-03 | Stop reason: HOSPADM

## 2022-08-03 RX ORDER — DOCUSATE SODIUM 100 MG/1
100 CAPSULE, LIQUID FILLED ORAL
Status: DISCONTINUED | OUTPATIENT
Start: 2022-08-03 | End: 2022-08-05 | Stop reason: HOSPADM

## 2022-08-03 RX ORDER — NORETHINDRONE AND ETHINYL ESTRADIOL 0.5-0.035
KIT ORAL
Status: DISCONTINUED
Start: 2022-08-03 | End: 2022-08-03 | Stop reason: WASHOUT

## 2022-08-03 RX ORDER — SODIUM CHLORIDE 0.9 % (FLUSH) 0.9 %
5-40 SYRINGE (ML) INJECTION AS NEEDED
Status: DISCONTINUED | OUTPATIENT
Start: 2022-08-03 | End: 2022-08-05 | Stop reason: HOSPADM

## 2022-08-03 RX ORDER — SODIUM CHLORIDE, SODIUM LACTATE, POTASSIUM CHLORIDE, CALCIUM CHLORIDE 600; 310; 30; 20 MG/100ML; MG/100ML; MG/100ML; MG/100ML
125 INJECTION, SOLUTION INTRAVENOUS CONTINUOUS
Status: DISCONTINUED | OUTPATIENT
Start: 2022-08-03 | End: 2022-08-05 | Stop reason: HOSPADM

## 2022-08-03 RX ORDER — AMMONIA 15 % (W/V)
1 AMPUL (EA) INHALATION AS NEEDED
Status: DISCONTINUED | OUTPATIENT
Start: 2022-08-03 | End: 2022-08-05 | Stop reason: HOSPADM

## 2022-08-03 RX ADMIN — SODIUM CHLORIDE, POTASSIUM CHLORIDE, SODIUM LACTATE AND CALCIUM CHLORIDE 125 ML/HR: 600; 310; 30; 20 INJECTION, SOLUTION INTRAVENOUS at 03:59

## 2022-08-03 RX ADMIN — LIDOCAINE HYDROCHLORIDE,EPINEPHRINE BITARTRATE 2 ML: 15; .005 INJECTION, SOLUTION EPIDURAL; INFILTRATION; INTRACAUDAL; PERINEURAL at 09:53

## 2022-08-03 RX ADMIN — IBUPROFEN 600 MG: 600 TABLET ORAL at 17:29

## 2022-08-03 RX ADMIN — SODIUM CHLORIDE, POTASSIUM CHLORIDE, SODIUM LACTATE AND CALCIUM CHLORIDE 125 ML/HR: 600; 310; 30; 20 INJECTION, SOLUTION INTRAVENOUS at 03:27

## 2022-08-03 RX ADMIN — Medication 10 ML/HR: at 10:01

## 2022-08-03 RX ADMIN — BUPIVACAINE HYDROCHLORIDE 3 ML: 5 INJECTION, SOLUTION EPIDURAL; INTRACAUDAL; PERINEURAL at 11:01

## 2022-08-03 RX ADMIN — BUPIVACAINE HYDROCHLORIDE 1 ML: 5 INJECTION, SOLUTION EPIDURAL; INTRACAUDAL; PERINEURAL at 09:55

## 2022-08-03 RX ADMIN — ACETAMINOPHEN 650 MG: 325 TABLET ORAL at 20:53

## 2022-08-03 RX ADMIN — FENTANYL CITRATE 50 MCG: 50 INJECTION, SOLUTION INTRAMUSCULAR; INTRAVENOUS at 09:57

## 2022-08-03 RX ADMIN — FENTANYL CITRATE 50 MCG: 50 INJECTION, SOLUTION INTRAMUSCULAR; INTRAVENOUS at 09:58

## 2022-08-03 RX ADMIN — OXYTOCIN 87.3 MILLI-UNITS/MIN: 10 INJECTION, SOLUTION INTRAMUSCULAR; INTRAVENOUS at 17:08

## 2022-08-03 RX ADMIN — OXYTOCIN 2 MILLI-UNITS/MIN: 10 INJECTION, SOLUTION INTRAMUSCULAR; INTRAVENOUS at 02:55

## 2022-08-03 RX ADMIN — BUPIVACAINE HYDROCHLORIDE 2 ML: 5 INJECTION, SOLUTION EPIDURAL; INTRACAUDAL; PERINEURAL at 09:56

## 2022-08-03 RX ADMIN — CALCIUM CARBONATE 200 MG: 500 TABLET, CHEWABLE ORAL at 12:35

## 2022-08-03 RX ADMIN — LIDOCAINE HYDROCHLORIDE,EPINEPHRINE BITARTRATE 2 ML: 15; .005 INJECTION, SOLUTION EPIDURAL; INFILTRATION; INTRACAUDAL; PERINEURAL at 09:54

## 2022-08-03 RX ADMIN — OXYTOCIN 10000 MILLI-UNITS: 10 INJECTION, SOLUTION INTRAMUSCULAR; INTRAVENOUS at 16:57

## 2022-08-03 RX ADMIN — BUPIVACAINE HYDROCHLORIDE 3 ML: 5 INJECTION, SOLUTION EPIDURAL; INTRACAUDAL; PERINEURAL at 10:57

## 2022-08-03 RX ADMIN — OXYCODONE 10 MG: 5 TABLET ORAL at 22:58

## 2022-08-03 RX ADMIN — ONDANSETRON HYDROCHLORIDE 4 MG: 2 SOLUTION INTRAMUSCULAR; INTRAVENOUS at 13:38

## 2022-08-03 NOTE — ANESTHESIA POSTPROCEDURE EVALUATION
Post-Anesthesia Evaluation and Assessment    Patient: Dari Doty MRN: [id number]  SSN: [id number]    YOB: 1985  Age: 39 y.o. Sex: female      I have evaluated the patient and they are stable and ready for discharge from the PACU. Cardiovascular Function/Vital Signs  Visit Vitals  BP (!) 116/57   Pulse 88   Temp 36.7 °C (98.1 °F)   Resp 16   Wt 82.6 kg (182 lb)   SpO2 99%   Breastfeeding Unknown   BMI 33.29 kg/m²       Patient is status post * No anesthesia type entered * anesthesia for * No procedures listed *. Nausea/Vomiting: None    Postoperative hydration reviewed and adequate. Pain:  Pain Scale 1: Numeric (0 - 10) (08/03/22 1704)  Pain Intensity 1: 0 (08/03/22 1704)   Managed    Neurological Status:   Neuro (WDL): Within Defined Limits (08/03/22 1704)   At baseline    Mental Status, Level of Consciousness: Alert and  oriented to person, place, and time    Pulmonary Status:   O2 Device: None (Room air) (08/03/22 0835)   Adequate oxygenation and airway patent    Complications related to anesthesia: None    Post-anesthesia assessment completed. No concerns    Signed By: Edgar Herman MD     August 3, 2022              * No procedures listed *.    epidural    <BSHSIANPOST>    INITIAL Post-op Vital signs:   Vitals Value Taken Time   /59 08/03/22 1834   Temp     Pulse 76 08/03/22 1834   Resp     SpO2     Vitals shown include unvalidated device data.

## 2022-08-03 NOTE — L&D DELIVERY NOTE
Delivery Summary    Patient: Lucy Souza MRN: [id number]  SSN: [id number]    YOB: 1985  Age: 39 y.o. Sex: female       Information for the patient's :  Stephanie Briggs [[id number]]     Called to beside by nursing for delivery after patient had been pushing for over one hour - I came promptly to the room to find baby had been delivered by RN.  was vigorous and crying on patient's chest with umbilical cord attached. Delayed cord clamping performed, then cut by the father of the baby. A second degree perineal laceration was identified and repaired using 3-0 rapid in a running fashion. Her bladder was drained using sterile technique. The placenta delivered after about 30 minutes, intact with accessory lobe visible and also intact. Fundus was firm and bleeding appropriate. Labor Events:    Labor: No    Steroids: None   Cervical Ripening Date/Time:       Cervical Ripening Type: Misoprostol   Antibiotics During Labor: No   Rupture Identifier:      Rupture Date/Time: 8/3/2022 9:12 AM   Rupture Type: AROM   Amniotic Fluid Volume:      Amniotic Fluid Description: Clear    Amniotic Fluid Odor: None    Induction: Gan Bulb (balloon); Oxytocin       Induction Date/Time: 2022 10:00 AM    Indications for Induction: Other(comment)    Augmentation:     Augmentation Date/Time: 8/3/88511:55 AM   Indications for Augmentation:     Labor complications:          Additional complications:        Delivery Events:  Indications For Episiotomy:     Episiotomy:     Perineal Laceration(s): 2nd   Repaired:     Periurethral Laceration Location:      Repaired:     Labial Laceration Location:     Repaired:     Sulcal Laceration Location:     Repaired:     Vaginal Laceration Location:     Repaired:     Cervical Laceration Location:     Repaired:     Repair Suture: Rapide 3-0   Number of Repair Packets: 2   Estimated Blood Loss (ml):  ml   Quantitative Blood Loss (ml) Delivery Date: 8/3/2022    Delivery Time: 4:13 PM  Delivery Type: Vaginal, Spontaneous  Sex:  Female    Gestational Age: 42w4d   Delivery Clinician:  Niharika Holloway  Living Status: Living   Delivery Location: L&D            APGARS  One minute Five minutes Ten minutes   Skin color: 1   1        Heart rate: 2   2        Grimace: 2   2        Muscle tone: 2   2        Breathin   2        Totals: 9   9            Presentation: Vertex    Position:        Resuscitation Method:  Tactile Stimulation     Meconium Stained: Thin      Cord Information: 3 Vessels  Complications: None  Cord around:    Delayed cord clamping? Yes  Cord clamped date/time:8/3/2022  4:29 PM  Disposition of Cord Blood: Lab    Blood Gases Sent?: No    Placenta:  Date/Time: 8/3/2022  4:54 PM  Removal: Expressed      Appearance: Normal      Measurements:  Birth Weight:        Birth Length:        Head Circumference:        Chest Circumference:       Abdominal Girth: Other Providers:   Cortney Bowen, Obstetrician;Primary Nurse;Primary Skipwith Nurse;Nicu Nurse;Neonatologist;Anesthesiologist;Crna;Nurse Practitioner;Midwife;Nursery Nurse         Group B Strep:   Lab Results   Component Value Date/Time    GrBStrep, External negative 2022 12:00 AM     Information for the patient's :  Bibiana Navarro [[id number]]   No results found for: ABORH, PCTABR, PCTDIG, BILI, ABORHEXT, ABORH   No results for input(s): PCO2CB, PO2CB, HCO3I, SO2I, IBD, PTEMPI, SPECTI, PHICB, ISITE, IDEV, IALLEN in the last 72 hours.

## 2022-08-03 NOTE — PROGRESS NOTES
Called to bedside urgently for delivery. Pt unknown to me. RN, partner and  at bedside. Upon my arrival, head was already out. As I was ungloved, I guided the RN's hands to deliver the rest of the baby. She was placed directly on mom's abdomen. Terminal mec noted. At this point, Dr. Mary Espinal (primary OB) arrived at the bedside and managed delivery of placenta and laceration repair.

## 2022-08-03 NOTE — PROGRESS NOTES
[facility] Labor Progress Note     Patient: Santy Gaitan MRN: [id number]  SSN: [id number]    YOB: 1985  Age: 39 y.o. Sex: female        Subjective:   Pt was able to rest and sleep for 2 hours. Pitocin started at 0300. Objective:   Patient Vitals for the past 4 hrs:   Temp Pulse Resp BP   08/03/22 0401 98.1 °F (36.7 °C) 64 16 111/76       Cervical Exam: defer  Membranes:  Intact  Current intervetions: ambulating  Pain management: prn  Pitocin @ 10 miliunits/hr      Assessment:   Fetal Heart Rate: Reactive  Baseline: 140 per minute  Variability: moderate  Accelerations: yes  Decelerations: none    Contractions:  External/Internal  Frequency: 3-6min  Duration: 40-90  Strength: mild to moderate    Intrauterine pregnancy at term  Category 1 fetal heart rate tracing         Plan:   Continue current orders/management  Re-evaluate in 2-4 hours or prn.   Sign off to on coming MD. Ok Lopez CNM

## 2022-08-03 NOTE — PROGRESS NOTES
Bedside and Verbal shift change report given to JÚNIOR Seals RN (oncoming nurse) by Mohit Zuñiga RN (offgoing nurse). Report included the following information SBAR, Kardex, Intake/Output, MAR, Accordion, and Recent Results. 2000- Pt ambulating in the hallway with her . 2032- Pt back in bed resting, states sontractions have gotten stronger, but is coping well. 2150- CHUCHO Molina Harness at bedside discussing plan of care. Pt states she had a small gush of fluid. 2200- Nitrazine negative. 1439- CNM at bedside with patient, gillespie bulb removed, SVE 4-5/70/-2.    0750- Bedside and Verbal shift change report given to Mohit Zuñiga RN (oncoming nurse) by JÚNIOR Seals RN (offgoing nurse). Report included the following information SBAR, Kardex, Intake/Output, MAR, Accordion, and Recent Results.

## 2022-08-03 NOTE — PROGRESS NOTES
[facility] Labor Progress Note     Patient: Caesar Drivers MRN: [id number]  SSN: [id number]    YOB: 1985  Age: 39 y.o. Sex: female        Subjective:   Patient seen and  discussed next step in labor management. Pt request to call  and ask for her input. Pt is feeling overwhlemed and fatigue. She has not slept more then 30min in the last 24hrs per patient and . Discussed SVE and balloon removal and starting pitocin augmentation. Pt request to have a undisturbed nap prior to Pitocin starting. Agree to sleep and start Pitocin titrate in 4 hours. Objective:   Patient Vitals for the past 4 hrs:   Temp Pulse Resp BP   08/02/22 2254 98.3 °F (36.8 °C) 72 16 125/76       Cervical Exam: 4 cm dilated    70% effaced    -2 station    Presenting Part: cephalic  Membranes:  Intact  Amniotic fluid none  Current intervetions: balloon removed after 12 hours being placed. Pain management: prn      Assessment:   Fetal Heart Rate: Reactive  Baseline: 140 per minute  Variability: moderate  Accelerations: yes  Decelerations: none    Contractions:  External/Internal  Frequency: 5-7min  Duration:   Strength: mild    Intrauterine pregnancy at term  Category 1 fetal heart rate tracing         Plan:   Nubain IV and Benadryl po given for sleep. Start Pitocin augmentation at 0230. Re-evaluate in 4 hours or prn.       Jonathan Melgoza

## 2022-08-03 NOTE — ANESTHESIA PROCEDURE NOTES
Epidural Block    Patient location during procedure: OB  Start time: 8/3/2022 7:46 PM  End time: 8/3/2022 7:58 PM  Reason for block: labor epidural  Staffing  Performed: attending   Anesthesiologist: Yeimy Blandon MD  Preanesthetic Checklist  Completed: patient identified, IV checked, site marked, risks and benefits discussed, surgical consent, monitors and equipment checked, pre-op evaluation and timeout performed  Block Placement  Patient position: left lateral decubitus  Prep: ChloraPrep  Sterility prep: cap, drape, gloves and mask  Sedation level: no sedation  Patient monitoring: continuous pulse oximetry and heart rate  Approach: midline  Location: lumbar  Lumbar location: L3-L4  Epidural  Loss of resistance technique: air  Guidance: landmark technique  Needle  Needle type: Tuohy   Needle gauge: 17 G  Needle length: 9 cm  Needle insertion depth: 7 cm  Catheter type: end hole  Catheter size: 19 G  Catheter at skin depth: 12 cm  Catheter securement method: clear occlusive dressing and surgical tape  Test dose: negative  Assessment  Sensory level: T10  Block outcome: pain improved  Number of attempts: 1  Procedure assessment: patient tolerated procedure well with no immediate complications

## 2022-08-03 NOTE — ANESTHESIA PREPROCEDURE EVALUATION
Relevant Problems   NEUROLOGY   (+) Frequent episodic tension-type headache      GASTROINTESTINAL   (+) Cholestasis during pregnancy       Anesthetic History   No history of anesthetic complications            Review of Systems / Medical History  Patient summary reviewed, nursing notes reviewed and pertinent labs reviewed    Pulmonary          Smoker         Neuro/Psych   Within defined limits           Cardiovascular  Within defined limits                Exercise tolerance: >4 METS     GI/Hepatic/Renal  Within defined limits             Comments: intrahep cholestas of preg Endo/Other  Within defined limits           Other Findings   Comments: TIUP         Physical Exam    Airway  Mallampati: II  TM Distance: > 6 cm  Neck ROM: normal range of motion   Mouth opening: Normal     Cardiovascular  Regular rate and rhythm,  S1 and S2 normal,  no murmur, click, rub, or gallop             Dental  No notable dental hx       Pulmonary  Breath sounds clear to auscultation               Abdominal  GI exam deferred       Other Findings            Anesthetic Plan    ASA: 2  Anesthesia type: epidural      Post-op pain plan if not by surgeon: indwelling epidural catheter    Induction: Intravenous  Anesthetic plan and risks discussed with: Patient

## 2022-08-03 NOTE — PROGRESS NOTES
9974- Bedside and Verbal shift change report given to Summer Finley RN (oncoming nurse) by Jaclyn Randhawa RN (offgoing nurse). Report included the following information SBAR, Kardex, MAR, and Recent Results. 6076- Dr Olivares Dry to bedside SVE 4/50/-2 SROM clear fluid. Patient in increased pain with contractions. Epidural requested. 1000- epidural placed, patient on L side   1015- patient turned to R side with peanut ball  1100- Dr Christy Mcleod at bedside to redose epidural  1120-L side lying with peanut ball- patient resting comfortably,  Cee-Cee at bedside  1200- R side lying with peanut ball  1245- L side lying with peanut ball  1340- R side lying with peanut ball  1440- patient feeling pressure SVE 10/100/+1  1505- start pushing. RN at bedside, continuously monitoring FHR tracing   1534- tozer to bedside  1600- RN at bedside, continuously monitoring FHR tracing   1614-  vigorous baby girl born and placed on moms chest  1935- TRANSFER - OUT REPORT:    Verbal report given to MARISSA Macias RN(name) on Santy Gaitan  being transferred to MIU(unit) for routine post - op       Report consisted of patients Situation, Background, Assessment and   Recommendations(SBAR). Information from the following report(s) SBAR, Kardex, MAR, and Recent Results was reviewed with the receiving nurse. Lines:   Peripheral IV 22 Left;Posterior Hand (Active)   Site Assessment Clean, dry, & intact 22 0831   Phlebitis Assessment 0 22 0831   Infiltration Assessment 0 22 0831   Dressing Status Clean, dry, & intact 22 0831   Dressing Type Transparent;Tape 22 0831   Hub Color/Line Status Pink 22 0831   Action Taken Open ports on tubing capped 22   Alcohol Cap Used No 22        Opportunity for questions and clarification was provided.       Patient transported with:   Registered Nurse

## 2022-08-04 PROCEDURE — 65410000002 HC RM PRIVATE OB

## 2022-08-04 PROCEDURE — 74011250637 HC RX REV CODE- 250/637: Performed by: OBSTETRICS & GYNECOLOGY

## 2022-08-04 PROCEDURE — 74011250637 HC RX REV CODE- 250/637: Performed by: NURSE PRACTITIONER

## 2022-08-04 RX ORDER — IBUPROFEN 600 MG/1
600 TABLET ORAL
Qty: 30 TABLET | Refills: 0 | Status: SHIPPED | OUTPATIENT
Start: 2022-08-04 | End: 2022-08-05 | Stop reason: SDUPTHER

## 2022-08-04 RX ADMIN — IBUPROFEN 600 MG: 600 TABLET ORAL at 12:26

## 2022-08-04 RX ADMIN — ACETAMINOPHEN 650 MG: 325 TABLET ORAL at 19:34

## 2022-08-04 RX ADMIN — OXYCODONE 10 MG: 5 TABLET ORAL at 03:06

## 2022-08-04 RX ADMIN — ACETAMINOPHEN 650 MG: 325 TABLET ORAL at 09:20

## 2022-08-04 RX ADMIN — ACETAMINOPHEN 650 MG: 325 TABLET ORAL at 15:30

## 2022-08-04 RX ADMIN — ACETAMINOPHEN 650 MG: 325 TABLET ORAL at 04:55

## 2022-08-04 RX ADMIN — IBUPROFEN 600 MG: 600 TABLET ORAL at 06:19

## 2022-08-04 RX ADMIN — DOCUSATE SODIUM 100 MG: 100 CAPSULE, LIQUID FILLED ORAL at 04:55

## 2022-08-04 RX ADMIN — IBUPROFEN 600 MG: 600 TABLET ORAL at 18:36

## 2022-08-04 RX ADMIN — IBUPROFEN 600 MG: 600 TABLET ORAL at 00:07

## 2022-08-04 RX ADMIN — ACETAMINOPHEN 650 MG: 325 TABLET ORAL at 01:03

## 2022-08-04 NOTE — DISCHARGE INSTRUCTIONS
Postpartum Support Groups (virtual)  We know that all of us are dealing with a tremendous amount of uncertainty, confusion and disruption to our daily lives, which may result in increased anxiety, depression and fear. If you are feeling unsettled or worse, please know that we are here to help. During this time of increased caution and care for one another, [facility] [place] (Jaelyn Mcadams) is offering virtual support groups to ALL MOTHERS in [place] regardless of the age of your child/children as a way to help weather this emotional storm together. Social support is an important part of self-care during this time of physical distancing. Virtual postpartum support group meetings available at www. postpartumva.org  Warm Line: 892.101.8795    Breastfeeding Support Groups    and  of each month   and  of each month    [place] at www.[employer] under the \"About Us\" and \"Classes and Events tabs\"       Postpartum: Care Instructions  Overview  After childbirth (postpartum period), your body goes through many changes. Some of these changes happen over several weeks. In the hours after delivery, your body will begin to recover from childbirth while it prepares to breastfeed your . You may feel emotional during this time. Your hormones can shift your mood without warning for no clear reason. In the first couple of weeks after childbirth, it's common to have emotions that change from happy to sad. You may find it hard to sleep. You may cry a lot. This is called the \"baby blues. \" These overwhelming emotions often go away within a couple of days or weeks. But it's important to discuss your feelings with your doctor. It's easy to get too tired and overwhelmed during the first weeks after childbirth. Don't try to do too much. Get rest whenever you can, accept help from others, and eat well and drink plenty of fluids.   In the first couple of weeks after you give birth, your doctor or midwife may want to check in with you and make a plan for any follow-up care you may need. You will likely have a complete postpartum visit in the first 3 months after delivery. At that time, your doctor or midwife will check on your recovery from childbirth and see how you're doing with your emotions. You may also discuss your concerns or questions. Follow-up care is a key part of your treatment and safety. Be sure to make and go to all appointments, and call your doctor if you are having problems. It's also a good idea to know your test results and keep a list of the medicines you take. How can you care for yourself at home? Sleep or rest when your baby sleeps. Get help with household chores from family or friends, if you can. Don't try to do it all yourself. If you have hemorrhoids or swelling or pain around the opening of your vagina, try using cold and heat. You can put ice or a cold pack on the area for 10 to 20 minutes at a time. Put a thin cloth between the ice and your skin. Also try sitting in a few inches of warm water (sitz bath) 3 times a day and after bowel movements. Take pain medicines exactly as directed. If the doctor gave you a prescription medicine for pain, take it as prescribed. If you are not taking a prescription pain medicine, ask your doctor if you can take an over-the-counter medicine. Eat more fiber to avoid constipation. Include foods such as whole-grain breads and cereals, raw vegetables, raw and dried fruits, and beans. Drink plenty of fluids. If you have kidney, heart, or liver disease and have to limit fluids, talk with your doctor before you increase the amount of fluids you drink. Do not rinse inside your vagina with fluids (douche). If you have stitches, keep the area clean by pouring or spraying warm water over the area outside your vagina and anus after you use the toilet. Keep a list of questions to ask your doctor or midwife.  Your questions might be about:  Changes in your breasts, such as lumps or soreness. When to expect your menstrual period to start again. What form of birth control is best for you. Weight you have put on during the pregnancy. Exercise options. What foods and drinks are best for you, especially if you are breastfeeding. Problems you might be having with breastfeeding. When you can have sex. You may want to talk about lubricants for your vagina. Any feelings of sadness or restlessness that you are having. When should you call for help? Share this information with your partner, family, or a friend. They can help you watch for warning signs. Call 911  anytime you think you may need emergency care. For example, call if:    You have thoughts of harming yourself, your baby, or another person. You passed out (lost consciousness). You have chest pain, are short of breath, or cough up blood. You have a seizure. Call your doctor now or seek immediate medical care if:    You have signs of hemorrhage (too much bleeding), such as:  Heavy vaginal bleeding. This means that you are soaking through one or more pads in an hour. Or you pass blood clots bigger than an egg. Feeling dizzy or lightheaded, or you feel like you may faint. Feeling so tired or weak that you cannot do your usual activities. A fast or irregular heartbeat. New or worse belly pain. You have signs of infection, such as:  A fever. Vaginal discharge that smells bad. New or worse belly pain. You have symptoms of a blood clot in your leg (called a deep vein thrombosis), such as:  Pain in the calf, back of the knee, thigh, or groin. Redness and swelling in your leg or groin. You have signs of preeclampsia, such as:  Sudden swelling of your face, hands, or feet. New vision problems (such as dimness, blurring, or seeing spots). A severe headache.    Watch closely for changes in your health, and be sure to contact your doctor if:    Your vaginal bleeding isn't decreasing. You feel sad, anxious, or hopeless for more than a few days. You are having problems with your breasts or breastfeeding. Where can you learn more? Go to http://www.gray.com/  Enter X598 in the search box to learn more about \"Postpartum: Care Instructions. \"  Current as of: June 16, 2021               Content Version: 13.2  © 2006-2022 [employer]. Care instructions adapted under license by [employer] (which disclaims liability or warranty for this information). If you have questions about a medical condition or this instruction, always ask your healthcare professional. [address] any warranty or liability for your use of this information.

## 2022-08-04 NOTE — ROUTINE PROCESS
Bedside and Verbal shift change report given to ESVIN Ware RN (oncoming nurse) by Ayala Davila RN (offgoing nurse). Report included the following information SBAR and MAR.

## 2022-08-04 NOTE — ROUTINE PROCESS
Bedside shift change report given to David Huff, VICKIE and ESVIN Henning RN (orienting RN) (oncoming nurse) by Cristo Piña RN (offgoing nurse). Report included the following information SBAR.      1900: I have reviewed assessments and charting by ESVIN Henning RN (orienting nurse). I agree with her assessments and charting.

## 2022-08-04 NOTE — ROUTINE PROCESS
1935: TRANSFER - IN REPORT:    Verbal report received from Matt Reyes RN (name) on Timi Yanez  being received from L&D (unit) for routine progression of care      Report consisted of patients Situation, Background, Assessment and   Recommendations(SBAR). Information from the following report(s) SBAR and Kardex was reviewed with the receiving nurse. Opportunity for questions and clarification was provided. Assessment completed upon patients arrival to unit and care assumed.

## 2022-08-04 NOTE — PROGRESS NOTES
Post-Partum Day Number 1 Progress Note    Robina Hess     Assessment: Doing well, post partum day 1    Plan:  - Continue routine postpartum and perineal care as well as maternal education.  - N/A   - Plan discharge home [facility] Discharge Date: Tomorrow    - itching from cholestasis has resolved. Information for the patient's :  Rajinder Mandujano [[id number]]   Vaginal, Spontaneous  Patient doing well without significant complaint. Voiding without difficulty, normal lochia. Vitals:  Visit Vitals  /76   Pulse 73   Temp 97.5 °F (36.4 °C)   Resp 16   Wt 82.6 kg (182 lb)   SpO2 99%   Breastfeeding Unknown   BMI 33.29 kg/m²     Temp (24hrs), Av.2 °F (36.8 °C), Min:97.5 °F (36.4 °C), Max:99.1 °F (37.3 °C)        Exam:   Patient without distress. Fundus firm, nontender                 Perineum with normal lochia noted per nursing assessment. Lower extremities are negative for pathological edema. Labs:     Lab Results   Component Value Date/Time    WBC 10.6 2022 05:17 PM    HGB 11.2 (L) 2022 05:17 PM    HCT 31.8 (L) 2022 05:17 PM    PLATELET 628  05:17 PM       No results found for this or any previous visit (from the past 24 hour(s)).

## 2022-08-04 NOTE — LACTATION NOTE
This note was copied from a baby's chart. Initial Lactation Consultation: Infant born vaginally yesterday afternoon to a  mom at 40 1/7 weeks gestation. Per mom, infant is latching well and she can hear swallows. Did not see infant at breast at this visit as she had just eaten. Encouraged mom to offer the breast at least every 3 hours or in response to feeding cues.

## 2022-08-04 NOTE — DISCHARGE SUMMARY
Obstetrical Discharge Summary     Name: Harlan Echavarria MRN: [id number]  SSN: [id number]    YOB: 1985  Age: 39 y.o. Sex: female      Admit Date: 2022    Discharge Date: 22    Admitting Physician: Yvon Tanner MD     Attending Physician:  Marija Mcnally MD     Admission Diagnoses: Cholestasis during pregnancy [O26.619, K83.1]    Discharge Diagnoses:   Information for the patient's :  Moi Alvares [[id number]]   Delivery of a 2.72 kg female infant via Vaginal, Spontaneous on 8/3/2022 at 4:13 PM  by Kelly Wilson. Apgars were 9  and 9 . Additional Diagnoses:   Hospital Problems  Date Reviewed: 8/3/2022            Codes Class Noted POA    Cholestasis during pregnancy ICD-10-CM: O26.619, K83.1  ICD-9-CM: 646.70  2022 Unknown          Lab Results   Component Value Date/Time    Rubella, External immune 2022 12:00 AM    GrBStrep, External negative 2022 12:00 AM       Hospital Course: Normal hospital course following the delivery. Patient Instructions:   Current Discharge Medication List        START taking these medications    Details   ibuprofen (MOTRIN) 600 mg tablet Take 1 Tablet by mouth every six (6) hours as needed for Pain. Qty: 30 Tablet, Refills: 0           CONTINUE these medications which have NOT CHANGED    Details   PNV Comb #2-Iron-Omega 3-FA [id number] mg cmpk Take  by mouth.            STOP taking these medications       ursodioL (ACTIGALL) 300 mg capsule Comments:   Reason for Stopping:         hydrOXYzine pamoate (VISTARIL) 50 mg capsule Comments:   Reason for Stopping:         spironolactone (ALDACTONE) 100 mg tablet Comments:   Reason for Stopping:         levonorgestrel (MIRENA) 20 mcg/24 hr (5 years) IUD Comments:   Reason for Stopping:         naproxen sodium (ALEVE) 220 mg tablet Comments:   Reason for Stopping:               Disposition at Discharge: Home or self care    Condition at Discharge: Stable    Reference my discharge instructions. Follow-up Appointments   Procedures    FOLLOW UP VISIT Appointment in: 6 Weeks     Standing Status:   Standing     Number of Occurrences:   1     Order Specific Question:   Appointment in     Answer:   6 Weeks        Signed By:  Jenny Blank MD     August 4, 2022                      .

## 2022-08-05 VITALS
DIASTOLIC BLOOD PRESSURE: 78 MMHG | HEART RATE: 74 BPM | SYSTOLIC BLOOD PRESSURE: 119 MMHG | BODY MASS INDEX: 33.29 KG/M2 | RESPIRATION RATE: 16 BRPM | TEMPERATURE: 98.7 F | WEIGHT: 182 LBS | OXYGEN SATURATION: 98 %

## 2022-08-05 PROCEDURE — 74011250637 HC RX REV CODE- 250/637: Performed by: OBSTETRICS & GYNECOLOGY

## 2022-08-05 PROCEDURE — 74011250637 HC RX REV CODE- 250/637: Performed by: NURSE PRACTITIONER

## 2022-08-05 RX ORDER — IBUPROFEN 600 MG/1
600 TABLET ORAL
Qty: 30 TABLET | Refills: 1 | Status: SHIPPED | OUTPATIENT
Start: 2022-08-05

## 2022-08-05 RX ADMIN — ACETAMINOPHEN 650 MG: 325 TABLET ORAL at 12:08

## 2022-08-05 RX ADMIN — IBUPROFEN 600 MG: 600 TABLET ORAL at 01:57

## 2022-08-05 RX ADMIN — ACETAMINOPHEN 650 MG: 325 TABLET ORAL at 05:55

## 2022-08-05 RX ADMIN — IBUPROFEN 600 MG: 600 TABLET ORAL at 08:22

## 2022-08-05 RX ADMIN — DOCUSATE SODIUM 100 MG: 100 CAPSULE, LIQUID FILLED ORAL at 12:16

## 2022-08-05 RX ADMIN — ACETAMINOPHEN 650 MG: 325 TABLET ORAL at 01:57

## 2022-08-05 NOTE — PROGRESS NOTES
Post-Partum Day Number 2 Progress Note    Alanis Delay Dillulio     Assessment: Doing well, post partum day 2    Plan:   - Discharge home today  - Follow up in office in 6 week(s) with [facility].  - Pain medication prescription(s) sent. - Questions answered. Information for the patient's :  Butch Comer [[id number]]   Vaginal, Spontaneous  Patient doing well without significant complaint. Voiding without difficulty, normal lochia. Ready for discharge home. Vitals:  Visit Vitals  /78 (BP 1 Location: Left arm, BP Patient Position: At rest)   Pulse 74   Temp 98.7 °F (37.1 °C)   Resp 16   Wt 82.6 kg (182 lb)   SpO2 98%   Breastfeeding Unknown   BMI 33.29 kg/m²     Temp (24hrs), Av.3 °F (36.8 °C), Min:97.9 °F (36.6 °C), Max:98.7 °F (37.1 °C)      Exam:        Patient without distress. Fundus firm, nontender per nursing fundal checks                Perineum with normal lochia noted per nursing assessment                Lower extremities are negative for pathological edema    Labs:     Lab Results   Component Value Date/Time    WBC 10.6 2022 05:17 PM    HGB 11.2 (L) 2022 05:17 PM    HCT 31.8 (L) 2022 05:17 PM    PLATELET 218  05:17 PM       No results found for this or any previous visit (from the past 24 hour(s)).

## 2022-08-05 NOTE — ROUTINE PROCESS
Bedside shift change report given to CHUCHO Parmar RN (oncoming nurse) by Aileen Shanks RN and ESVIN Ware RN (offgoing nurse). Report included the following information SBAR and Kardex.

## 2023-03-27 ENCOUNTER — OFFICE VISIT (OUTPATIENT)
Dept: INTERNAL MEDICINE CLINIC | Age: 38
End: 2023-03-27
Payer: COMMERCIAL

## 2023-03-27 VITALS
BODY MASS INDEX: 27.34 KG/M2 | TEMPERATURE: 97.3 F | OXYGEN SATURATION: 97 % | HEIGHT: 62 IN | DIASTOLIC BLOOD PRESSURE: 75 MMHG | HEART RATE: 62 BPM | RESPIRATION RATE: 16 BRPM | SYSTOLIC BLOOD PRESSURE: 111 MMHG | WEIGHT: 148.6 LBS

## 2023-03-27 DIAGNOSIS — F41.9 ANXIETY: Primary | ICD-10-CM

## 2023-03-27 DIAGNOSIS — Z11.59 ENCOUNTER FOR HEPATITIS C SCREENING TEST FOR LOW RISK PATIENT: ICD-10-CM

## 2023-03-27 DIAGNOSIS — L72.9 CYST OF SKIN: ICD-10-CM

## 2023-03-27 DIAGNOSIS — R79.89 ABNORMAL LFTS: ICD-10-CM

## 2023-03-27 DIAGNOSIS — R63.4 UNINTENDED WEIGHT LOSS: ICD-10-CM

## 2023-03-27 DIAGNOSIS — Z13.220 SCREENING, LIPID: ICD-10-CM

## 2023-03-27 PROCEDURE — 99203 OFFICE O/P NEW LOW 30 MIN: CPT | Performed by: INTERNAL MEDICINE

## 2023-03-27 NOTE — LETTER
3/27/2023 10:38 AM    Ms. Melvin Espinoza  [address] [place] [address]      Dear Care Provider    Please fax us the most recent office notes and pap results so that we may update the patient's records for continuity of care.    Our fax number: [phone]     Patient:   Melvin Espinoza  1985        Sincerely,      Joana Concepcion MD

## 2023-03-27 NOTE — PROGRESS NOTES
PROGRESS NOTE  Name: Jerel Leyden   : 1985       ASSESSMENT/ PLAN:     Unintended weight loss: likely stress related. Check TSH.   -     CBC WITH AUTOMATED DIFF; Future  -     TSH 3RD GENERATION; Future  -     SED RATE (ESR); Future    Cyst of skin: Left anterior mid-forearm. The lesion is smooth, round, rubbery, mobile--benign features. Abnormal LFTs: Cholestasis related to pregnance; she would like to recheck labs. -     METABOLIC PANEL, COMPREHENSIVE; Future  -     CBC WITH AUTOMATED DIFF; Future    Anxiety: She has numerous stressors, as outlined. Continue follow up with therapist.   -     TSH 3RD GENERATION; Future    Screening, lipid:  -     LIPID PANEL; Future    Encounter for hepatitis C screening test for low risk patient:  -     HEPATITIS C AB; Future    Follow-up and Dispositions    Return in about 1 year (around 3/27/2024) for CPE. I have reviewed the patient's medications and risks/side effects/benefits were discussed. Diagnosis(-es) explained to patient and questions answered. Literature provided where appropriate. SUBJECTIVE:   Ms. Jerel Leyden is a 40 y.o. female who is here for follow up of routine medical issues. No chief complaint on file. She is 7 months post-partum. She has a new \"bump\" that arose on anterior left mid forearm. Anxiety and depression are doing a little worse. She is stressed: \"I run two businesses, I'm on the [employer], I am a mother. \" She sees a therapist every two weeks. She is losing weight, 2 lb in 2 weeks. \"But I'm not eating right, and not working out, so it doesn't make sense. \"   Wt Readings from Last 3 Encounters:   23 148 lb 9.6 oz (67.4 kg)   22 182 lb (82.6 kg)   18 124 lb 12.8 oz (56.6 kg)       At this time, she is otherwise doing well and has brought no other complaints to my attention today.   For a list of the medical issues addressed today, see the assessment and plan above.    PMH:   Past Medical History:   Diagnosis Date    Acne     Cholestasis during pregnancy     Optic nerve tumor 2006    Resolved over time with medication. Past Surgical History:   Procedure Laterality Date    HX WISDOM TEETH EXTRACTION Bilateral        All: She is allergic to penicillins. Current Outpatient Medications   Medication Sig    ibuprofen (MOTRIN) 600 mg tablet Take 1 Tablet by mouth every six (6) hours as needed for Pain. PNV Comb #2-Iron-Omega 3-FA [id number] mg cmpk Take  by mouth. No current facility-administered medications for this visit. FH: Her family history includes Hypertension in her father. 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 She reports that she has never smoked. She has never used smokeless tobacco. She reports that she does not drink alcohol and does not use drugs. Recovering former alcoholic, sober since 6375. ROS: See above; Complete ROS otherwise negative. OBJECTIVE:   Vitals: Visit Vitals  /75 (BP 1 Location: Left upper arm, BP Patient Position: Sitting, BP Cuff Size: Adult)   Pulse 62   Temp 97.3 °F (36.3 °C) (Temporal)   Resp 16   Ht 5' 2\" (1.575 m)   Wt 148 lb 9.6 oz (67.4 kg)   LMP 03/17/2023 (Approximate)   SpO2 97%   BMI 27.18 kg/m²      Gen: Pleasant 40 y.o.  female in NAD. HEENT: PERRLA. EOMI. OP moist and pink. Neck: Supple. No LAD. HEART: RRR, No M/G/R.    LUNGS: CTAB No W/R. ABDOMEN: S, NT, ND, BS+. EXTREMITIES: Warm. No C/C/E.  MUSCULOSKELETAL: Normal ROM, muscle strength 5/5 all groups. NEURO: Alert and oriented x 3. Cranial nerves grossly intact. No focal sensory or motor deficits noted. SKIN: Warm. Dry. No rashes or other lesions noted.     Lab Results   Component Value Date/Time    Sodium 137 08/01/2022 05:17 PM    Potassium 3.6 08/01/2022 05:17 PM    Chloride 106 08/01/2022 05:17 PM    CO2 23 08/01/2022 05:17 PM    Anion gap 8 08/01/2022 05:17 PM Glucose 120 (H) 08/01/2022 05:17 PM    BUN 9 08/01/2022 05:17 PM    Creatinine 0.55 08/01/2022 05:17 PM    BUN/Creatinine ratio 16 08/01/2022 05:17 PM    GFR est AA >60 08/01/2022 05:17 PM    GFR est non-AA >60 08/01/2022 05:17 PM    Calcium 8.7 08/01/2022 05:17 PM    Bilirubin, total 0.4 08/01/2022 05:17 PM    ALT (SGPT) 21 08/01/2022 05:17 PM    Alk.  phosphatase 165 (H) 08/01/2022 05:17 PM    Protein, total 5.8 (L) 08/01/2022 05:17 PM    Albumin 2.5 (L) 08/01/2022 05:17 PM    Globulin 3.3 08/01/2022 05:17 PM    A-G Ratio 0.8 (L) 08/01/2022 05:17 PM       Lab Results   Component Value Date/Time    WBC 10.6 08/01/2022 05:17 PM    HGB 11.2 (L) 08/01/2022 05:17 PM    HCT 31.8 (L) 08/01/2022 05:17 PM    PLATELET 863 40/43/6104 05:17 PM    MCV 87.1 08/01/2022 05:17 PM

## 2023-03-27 NOTE — PROGRESS NOTES
1. \"Have you been to the ER, urgent care clinic since your last visit? Hospitalized since your last visit? Yes, [facility] right cornea     2. \"Have you seen or consulted any other health care providers outside of the [address] [place] [address] since your last visit?mYes     3. For patients aged 39-70: Has the patient had a colonoscopy / FIT/ Cologuard? NA - based on age      If the patient is female:    4. For patients aged 41-77: Has the patient had a mammogram within the past 2 years? NA - based on age or sex      11. For patients aged 21-65: Has the patient had a pap smear? Yes - Care Gap present.  Rooming MA/LPN to request most recent results

## 2023-03-28 LAB
ALBUMIN SERPL-MCNC: 4.2 G/DL (ref 3.5–5)
ALBUMIN/GLOB SERPL: 1.3 (ref 1.1–2.2)
ALP SERPL-CCNC: 88 U/L (ref 45–117)
ALT SERPL-CCNC: 18 U/L (ref 12–78)
ANION GAP SERPL CALC-SCNC: 5 MMOL/L (ref 5–15)
AST SERPL-CCNC: 14 U/L (ref 15–37)
BASOPHILS # BLD: 0.1 K/UL (ref 0–0.1)
BASOPHILS NFR BLD: 2 % (ref 0–1)
BILIRUB SERPL-MCNC: 0.5 MG/DL (ref 0.2–1)
BUN SERPL-MCNC: 14 MG/DL (ref 6–20)
BUN/CREAT SERPL: 16 (ref 12–20)
CALCIUM SERPL-MCNC: 9.7 MG/DL (ref 8.5–10.1)
CHLORIDE SERPL-SCNC: 104 MMOL/L (ref 97–108)
CHOLEST SERPL-MCNC: 188 MG/DL
CO2 SERPL-SCNC: 28 MMOL/L (ref 21–32)
CREAT SERPL-MCNC: 0.85 MG/DL (ref 0.55–1.02)
DIFFERENTIAL METHOD BLD: ABNORMAL
EOSINOPHIL # BLD: 0.1 K/UL (ref 0–0.4)
EOSINOPHIL NFR BLD: 1 % (ref 0–7)
ERYTHROCYTE [DISTWIDTH] IN BLOOD BY AUTOMATED COUNT: 12.3 % (ref 11.5–14.5)
ERYTHROCYTE [SEDIMENTATION RATE] IN BLOOD: 9 MM/HR (ref 0–20)
GLOBULIN SER CALC-MCNC: 3.3 G/DL (ref 2–4)
GLUCOSE SERPL-MCNC: 85 MG/DL (ref 65–100)
HCT VFR BLD AUTO: 40.1 % (ref 35–47)
HCV AB SERPL QL IA: NONREACTIVE
HDLC SERPL-MCNC: 95 MG/DL
HDLC SERPL: 2 (ref 0–5)
HGB BLD-MCNC: 13.5 G/DL (ref 11.5–16)
IMM GRANULOCYTES # BLD AUTO: 0 K/UL (ref 0–0.04)
IMM GRANULOCYTES NFR BLD AUTO: 0 % (ref 0–0.5)
LDLC SERPL CALC-MCNC: 82.6 MG/DL (ref 0–100)
LYMPHOCYTES # BLD: 1.9 K/UL (ref 0.8–3.5)
LYMPHOCYTES NFR BLD: 33 % (ref 12–49)
MCH RBC QN AUTO: 30.4 PG (ref 26–34)
MCHC RBC AUTO-ENTMCNC: 33.7 G/DL (ref 30–36.5)
MCV RBC AUTO: 90.3 FL (ref 80–99)
MONOCYTES # BLD: 0.4 K/UL (ref 0–1)
MONOCYTES NFR BLD: 7 % (ref 5–13)
NEUTS SEG # BLD: 3.4 K/UL (ref 1.8–8)
NEUTS SEG NFR BLD: 57 % (ref 32–75)
NRBC # BLD: 0 K/UL (ref 0–0.01)
NRBC BLD-RTO: 0 PER 100 WBC
PLATELET # BLD AUTO: 332 K/UL (ref 150–400)
PMV BLD AUTO: 10.9 FL (ref 8.9–12.9)
POTASSIUM SERPL-SCNC: 4.2 MMOL/L (ref 3.5–5.1)
PROT SERPL-MCNC: 7.5 G/DL (ref 6.4–8.2)
RBC # BLD AUTO: 4.44 M/UL (ref 3.8–5.2)
SODIUM SERPL-SCNC: 137 MMOL/L (ref 136–145)
TRIGL SERPL-MCNC: 52 MG/DL (ref ?–150)
TSH SERPL DL<=0.05 MIU/L-ACNC: 0.75 UIU/ML (ref 0.36–3.74)
VLDLC SERPL CALC-MCNC: 10.4 MG/DL
WBC # BLD AUTO: 5.9 K/UL (ref 3.6–11)

## 2023-07-25 ENCOUNTER — OFFICE VISIT (OUTPATIENT)
Age: 38
End: 2023-07-25
Payer: COMMERCIAL

## 2023-07-25 VITALS
OXYGEN SATURATION: 98 % | BODY MASS INDEX: 26.54 KG/M2 | RESPIRATION RATE: 14 BRPM | HEIGHT: 62 IN | WEIGHT: 144.2 LBS | TEMPERATURE: 97.7 F | DIASTOLIC BLOOD PRESSURE: 71 MMHG | SYSTOLIC BLOOD PRESSURE: 107 MMHG | HEART RATE: 72 BPM

## 2023-07-25 DIAGNOSIS — J02.9 PHARYNGITIS, UNSPECIFIED ETIOLOGY: Primary | ICD-10-CM

## 2023-07-25 PROCEDURE — 99213 OFFICE O/P EST LOW 20 MIN: CPT | Performed by: INTERNAL MEDICINE

## 2023-07-25 RX ORDER — AZITHROMYCIN 250 MG/1
250 TABLET, FILM COATED ORAL SEE ADMIN INSTRUCTIONS
Qty: 6 TABLET | Refills: 0 | Status: SHIPPED | OUTPATIENT
Start: 2023-07-25 | End: 2023-07-30

## 2023-07-25 SDOH — ECONOMIC STABILITY: INCOME INSECURITY: HOW HARD IS IT FOR YOU TO PAY FOR THE VERY BASICS LIKE FOOD, HOUSING, MEDICAL CARE, AND HEATING?: NOT HARD AT ALL

## 2023-07-25 SDOH — ECONOMIC STABILITY: FOOD INSECURITY: WITHIN THE PAST 12 MONTHS, THE FOOD YOU BOUGHT JUST DIDN'T LAST AND YOU DIDN'T HAVE MONEY TO GET MORE.: NEVER TRUE

## 2023-07-25 SDOH — ECONOMIC STABILITY: FOOD INSECURITY: WITHIN THE PAST 12 MONTHS, YOU WORRIED THAT YOUR FOOD WOULD RUN OUT BEFORE YOU GOT MONEY TO BUY MORE.: NEVER TRUE

## 2023-07-25 SDOH — ECONOMIC STABILITY: HOUSING INSECURITY
IN THE LAST 12 MONTHS, WAS THERE A TIME WHEN YOU DID NOT HAVE A STEADY PLACE TO SLEEP OR SLEPT IN A SHELTER (INCLUDING NOW)?: NO

## 2023-07-25 ASSESSMENT — ANXIETY QUESTIONNAIRES
3. WORRYING TOO MUCH ABOUT DIFFERENT THINGS: 1
7. FEELING AFRAID AS IF SOMETHING AWFUL MIGHT HAPPEN: 0
5. BEING SO RESTLESS THAT IT IS HARD TO SIT STILL: 1
GAD7 TOTAL SCORE: 4
6. BECOMING EASILY ANNOYED OR IRRITABLE: 0
2. NOT BEING ABLE TO STOP OR CONTROL WORRYING: 0
IF YOU CHECKED OFF ANY PROBLEMS ON THIS QUESTIONNAIRE, HOW DIFFICULT HAVE THESE PROBLEMS MADE IT FOR YOU TO DO YOUR WORK, TAKE CARE OF THINGS AT HOME, OR GET ALONG WITH OTHER PEOPLE: NOT DIFFICULT AT ALL
4. TROUBLE RELAXING: 1
1. FEELING NERVOUS, ANXIOUS, OR ON EDGE: 1

## 2023-07-25 ASSESSMENT — PATIENT HEALTH QUESTIONNAIRE - PHQ9
SUM OF ALL RESPONSES TO PHQ QUESTIONS 1-9: 0
SUM OF ALL RESPONSES TO PHQ QUESTIONS 1-9: 0
1. LITTLE INTEREST OR PLEASURE IN DOING THINGS: 0
2. FEELING DOWN, DEPRESSED OR HOPELESS: 0
SUM OF ALL RESPONSES TO PHQ QUESTIONS 1-9: 0
SUM OF ALL RESPONSES TO PHQ QUESTIONS 1-9: 0
SUM OF ALL RESPONSES TO PHQ9 QUESTIONS 1 & 2: 0

## 2023-07-25 NOTE — PROGRESS NOTES
1. \"Have you been to the ER, urgent care clinic since your last visit? Hospitalized since your last visit? \" No    2. \"Have you seen or consulted any other health care providers outside of the [address] [place] [address] since your last visit? \" No     3. For patients aged 43-73: Has the patient had a colonoscopy / FIT/ Cologuard? NA - based on age      If the patient is female:    4. For patients aged 43-66: Has the patient had a mammogram within the past 2 years? NA - based on age or sex      11. For patients aged 21-65: Has the patient had a pap smear?  No

## 2023-09-28 ENCOUNTER — TELEPHONE (OUTPATIENT)
Age: 38
End: 2023-09-28

## 2023-09-28 DIAGNOSIS — L81.9 PIGMENTED SKIN LESION SUSPICIOUS FOR MALIGNANT NEOPLASM: Primary | ICD-10-CM

## 2023-09-28 NOTE — TELEPHONE ENCOUNTER
The caller is requesting a referral order for:    Provider:    [facility] (any provider at practice)  Address:  [address] [place], [place] [address]  Phone:  [phone]  Fax:   Not provided by pt  Specialty:   Dermatology  Appt date:  Needs referral order to schedule (sooner than next summer)  Diagnosis:  Skin cancer lesion on back, originally removed by previous dermatologist, 2nd opinion/eval             Call pt to advise when referral placed or if appt with pcp is needed first

## 2023-10-04 ENCOUNTER — TELEPHONE (OUTPATIENT)
Age: 38
End: 2023-10-04

## 2023-10-04 NOTE — TELEPHONE ENCOUNTER
----- Message from Mirtha Adolfo sent at 10/4/2023 12:18 PM EDT -----  Subject: Message to Provider    QUESTIONS  Information for Provider? Lupis Martinez needs you to fax her referral to the   dermatologist in order for her to schedule an appt.  ---------------------------------------------------------------------------  --------------  [address]  [phone]; OK to leave message on voicemail  ---------------------------------------------------------------------------  --------------  SCRIPT ANSWERS  Relationship to Patient?  Self

## 2023-12-29 ENCOUNTER — NURSE TRIAGE (OUTPATIENT)
Dept: OTHER | Facility: CLINIC | Age: 38
End: 2023-12-29

## 2023-12-29 ENCOUNTER — TELEPHONE (OUTPATIENT)
Age: 38
End: 2023-12-29

## 2023-12-29 NOTE — TELEPHONE ENCOUNTER
I received a message from Triage the patient has been c/o jacinto palpations, headache and nausea for 2 days. I explained to her she needs to go to the ED for evaluation. This could be a MI; however, it may not be. She needs to seen. Patient verbalized understanding of information discussed w/ no further questions at this time.

## 2023-12-29 NOTE — TELEPHONE ENCOUNTER
Subjective: Caller states \"For the past 2 days my heart has been racing and I get nauseous. \"      Current Symptoms: palpitations, last 1-2 hours each episode. Mainly in afternoon/early evening. 1 episode each day. Occurred each time after eating. Not present at this time. Onset: 2 days ago; intermittent     Associated Symptoms: nausea without emesis. Headache with episode. Pain Severity: 0/10; ;      Temperature: denies      What has been tried: Excedrin without improvement when had headache. LMP:  ended yesterday  Pregnant: No     Recommended disposition: See in Office Today     Care advice provided, patient verbalizes understanding; denies any other questions or concerns; instructed to call back for any new or worsening symptoms. Patient/Caller agrees with recommended disposition; writer provided warm transfer to [facility] for appointment scheduling     Attention Provider: Thank you for allowing me to participate in the care of your patient. The patient was connected to triage in response to information provided to the ECC/PSC. Please do not respond through this encounter as the response is not directed to a shared pool.            Reason for Disposition   Patient wants to be seen    Protocols used: Heart Rate and Heartbeat Questions-ADULT-[place]

## 2023-12-29 NOTE — TELEPHONE ENCOUNTER
[facility] [place] [facility]    Received call from [facility] with [facility]. Subjective: Caller states \"For the past 2 days my heart has been racing and I get nauseous. \"     Current Symptoms: palpitations, last 1-2 hours each episode. Mainly in afternoon/early evening. 1 episode each day. Occurred each time after eating. Not present at this time. Onset: 2 days ago; intermittent    Associated Symptoms: nausea without emesis. Headache with episode. Pain Severity: 0/10; ;     Temperature: denies     What has been tried: Excedrin without improvement when had headache. LMP:  ended yesterday  Pregnant: No    Recommended disposition: See in Office Today    Care advice provided, patient verbalizes understanding; denies any other questions or concerns; instructed to call back for any new or worsening symptoms. Patient/Caller agrees with recommended disposition; writer provided warm transfer to [facility] for appointment scheduling    Attention Provider: Thank you for allowing me to participate in the care of your patient. The patient was connected to triage in response to information provided to the ECC/PSC. Please do not respond through this encounter as the response is not directed to a shared pool.         Reason for Disposition   Patient wants to be seen    Protocols used: Heart Rate and Heartbeat Questions-ADULT-[place]

## 2024-01-02 ENCOUNTER — OFFICE VISIT (OUTPATIENT)
Age: 39
End: 2024-01-02
Payer: COMMERCIAL

## 2024-01-02 VITALS
BODY MASS INDEX: 25.76 KG/M2 | DIASTOLIC BLOOD PRESSURE: 72 MMHG | WEIGHT: 140 LBS | OXYGEN SATURATION: 100 % | HEART RATE: 69 BPM | TEMPERATURE: 97.7 F | HEIGHT: 62 IN | RESPIRATION RATE: 18 BRPM | SYSTOLIC BLOOD PRESSURE: 104 MMHG

## 2024-01-02 DIAGNOSIS — R10.32 LLQ ABDOMINAL PAIN: Primary | ICD-10-CM

## 2024-01-02 LAB
ALBUMIN SERPL-MCNC: 4.1 G/DL (ref 3.5–5)
ALBUMIN/GLOB SERPL: 1.3 (ref 1.1–2.2)
ALP SERPL-CCNC: 72 U/L (ref 45–117)
ALT SERPL-CCNC: 17 U/L (ref 12–78)
ANION GAP SERPL CALC-SCNC: 4 MMOL/L (ref 5–15)
AST SERPL-CCNC: 8 U/L (ref 15–37)
BASOPHILS # BLD: 0.1 K/UL (ref 0–0.1)
BASOPHILS NFR BLD: 1 % (ref 0–1)
BILIRUB SERPL-MCNC: 0.7 MG/DL (ref 0.2–1)
BUN SERPL-MCNC: 15 MG/DL (ref 6–20)
BUN/CREAT SERPL: 18 (ref 12–20)
CALCIUM SERPL-MCNC: 9 MG/DL (ref 8.5–10.1)
CHLORIDE SERPL-SCNC: 107 MMOL/L (ref 97–108)
CO2 SERPL-SCNC: 28 MMOL/L (ref 21–32)
CREAT SERPL-MCNC: 0.84 MG/DL (ref 0.55–1.02)
DIFFERENTIAL METHOD BLD: NORMAL
EOSINOPHIL # BLD: 0.1 K/UL (ref 0–0.4)
EOSINOPHIL NFR BLD: 2 % (ref 0–7)
ERYTHROCYTE [DISTWIDTH] IN BLOOD BY AUTOMATED COUNT: 12.7 % (ref 11.5–14.5)
GLOBULIN SER CALC-MCNC: 3.2 G/DL (ref 2–4)
GLUCOSE SERPL-MCNC: 93 MG/DL (ref 65–100)
HCT VFR BLD AUTO: 40.4 % (ref 35–47)
HGB BLD-MCNC: 13.7 G/DL (ref 11.5–16)
IMM GRANULOCYTES # BLD AUTO: 0 K/UL (ref 0–0.04)
IMM GRANULOCYTES NFR BLD AUTO: 0 % (ref 0–0.5)
LYMPHOCYTES # BLD: 2 K/UL (ref 0.8–3.5)
LYMPHOCYTES NFR BLD: 29 % (ref 12–49)
MCH RBC QN AUTO: 30.6 PG (ref 26–34)
MCHC RBC AUTO-ENTMCNC: 33.9 G/DL (ref 30–36.5)
MCV RBC AUTO: 90.4 FL (ref 80–99)
MONOCYTES # BLD: 0.6 K/UL (ref 0–1)
MONOCYTES NFR BLD: 8 % (ref 5–13)
NEUTS SEG # BLD: 4.3 K/UL (ref 1.8–8)
NEUTS SEG NFR BLD: 60 % (ref 32–75)
NRBC # BLD: 0 K/UL (ref 0–0.01)
NRBC BLD-RTO: 0 PER 100 WBC
PLATELET # BLD AUTO: 351 K/UL (ref 150–400)
PMV BLD AUTO: 10.6 FL (ref 8.9–12.9)
POTASSIUM SERPL-SCNC: 4.5 MMOL/L (ref 3.5–5.1)
PROT SERPL-MCNC: 7.3 G/DL (ref 6.4–8.2)
RBC # BLD AUTO: 4.47 M/UL (ref 3.8–5.2)
SODIUM SERPL-SCNC: 139 MMOL/L (ref 136–145)
WBC # BLD AUTO: 7.1 K/UL (ref 3.6–11)

## 2024-01-02 PROCEDURE — 99213 OFFICE O/P EST LOW 20 MIN: CPT | Performed by: INTERNAL MEDICINE

## 2024-01-02 NOTE — PROGRESS NOTES
Chief Complaint   Patient presents with    Nausea    Palpitations    Oral Swelling     ORAL SWELLING--PT.STATED FELT LIKE SOMETHING WAS STUCK IN THROAT        Over [place] holiday, for a couple of days, she was not feeling well. She had nausea and headache, and heart was racing. Normal BM. No F/C, SOB, cough. No runny nose. No one else was sick.     She does still have some LLQ abdominal pain and tenderness.     Then a couple days later, she developed globus sensation, that lasted an hour.     OBJECTIVE  /72 (Site: Left Upper Arm, Position: Sitting, Cuff Size: Large Adult)   Pulse 69   Temp 97.7 °F (36.5 °C) (Temporal)   Resp 18   Ht 1.575 m (5' 2\")   Wt 63.5 kg (140 lb)   SpO2 100%   BMI 25.61 kg/m²   Gen: Pleasant 38 y.o.  female in NAD.   HEENT: PERRLA. EOMI. OP moist and pink.  Neck: Supple.  No LAD.  HEART: RRR, No M/G/R.   LUNGS: CTAB No W/R.   ABDOMEN: S, +TTP abdomen LLQ, ND, BS+.   EXTREMITIES: Warm. No C/C/E. NEURO: Alert and oriented x 3.  Cranial nerves grossly intact.  No focal sensory or motor deficits noted. SKIN: Warm. Dry. No rashes or other lesions noted.    ASSESSMENT / PLAN  Pam was seen today for nausea, palpitations and oral swelling.    Diagnoses and all orders for this visit:    LLQ abdominal pain  -     CT ABDOMEN PELVIS W WO CONTRAST Additional Contrast? Radiologist Recommendation; Future  -     CBC with Auto Differential; Future  -     Comprehensive Metabolic Panel; Future  -     Comprehensive Metabolic Panel  -     CBC with Auto Differential    Follow up later in 2024 with CPE

## 2024-01-02 NOTE — PROGRESS NOTES
1. \"Have you been to the ER, urgent care clinic since your last visit?  Hospitalized since your last visit?\" No    2. \"Have you seen or consulted any other health care providers outside of the [facility] since your last visit?\" No     3. For patients aged 45-75: Has the patient had a colonoscopy / FIT/ Cologuard? NA - based on age      If the patient is female:    4. For patients aged 40-74: Has the patient had a mammogram within the past 2 years? NA - based on age or sex      5. For patients aged 21-65: Has the patient had a pap smear? Yes - no Care Gap present

## 2024-01-11 ENCOUNTER — HOSPITAL ENCOUNTER (OUTPATIENT)
Facility: HOSPITAL | Age: 39
Discharge: HOME | End: 2024-01-11
Attending: INTERNAL MEDICINE
Payer: COMMERCIAL

## 2024-01-11 DIAGNOSIS — R10.32 LLQ ABDOMINAL PAIN: ICD-10-CM

## 2024-01-11 PROCEDURE — 74177 CT ABD & PELVIS W/CONTRAST: CPT

## 2024-01-11 PROCEDURE — 6360000004 HC RX CONTRAST MEDICATION: Performed by: RADIOLOGY

## 2024-01-11 RX ADMIN — IOPAMIDOL 100 ML: 755 INJECTION, SOLUTION INTRAVENOUS at 16:26

## 2024-06-13 LAB
ABO, EXTERNAL RESULT: NORMAL
C. TRACHOMATIS, EXTERNAL RESULT: NEGATIVE
GBS, EXTERNAL RESULT: NEGATIVE
HEP B, EXTERNAL RESULT: NEGATIVE
HIV, EXTERNAL RESULT: NON REACTIVE
N. GONORRHOEAE, EXTERNAL RESULT: NEGATIVE
RPR, EXTERNAL RESULT: NON REACTIVE
RUBELLA TITER, EXTERNAL RESULT: NORMAL

## 2024-10-14 ENCOUNTER — TELEPHONE (OUTPATIENT)
Age: 39
End: 2024-10-14

## 2024-10-14 NOTE — TELEPHONE ENCOUNTER
I advised the patient per , he recommends \"stool softeners (miralax, colace); Stool softeners are generally considered safe during pregnancy.      Drink plenty of water.      Take more fiber in diet\"    The patient reported she has done everything  has recommended and has taken 3 suppositories, with very little results.    I advised the patient, she needs to go to the ED for evaluation since she has not had a BM in almost 2 weeks.    Patient verbalized understanding of information discussed w/ no further questions at this time.

## 2024-10-14 NOTE — TELEPHONE ENCOUNTER
Pt is 25 wks pregnant and has had COVID for two weeks now.    She is very constipated.  Pt was advised by OB to call pcp.    What can we do for pt?  Please call to advise.

## 2024-10-14 NOTE — TELEPHONE ENCOUNTER
Would recommend stool softeners (miralax, colace); Stool softeners are generally considered safe during pregnancy.     Drink plenty of water.     Take more fiber in diet.     Good luck.   BJF

## 2024-12-20 LAB — GBS, EXTERNAL RESULT: NEGATIVE

## 2025-01-08 ENCOUNTER — HOSPITAL ENCOUNTER (INPATIENT)
Facility: HOSPITAL | Age: 40
LOS: 2 days | Discharge: HOME | End: 2025-01-10
Attending: OBSTETRICS & GYNECOLOGY | Admitting: OBSTETRICS & GYNECOLOGY
Payer: COMMERCIAL

## 2025-01-08 PROBLEM — O26.643 CHOLESTASIS DURING PREGNANCY IN THIRD TRIMESTER: Status: ACTIVE | Noted: 2025-01-08

## 2025-01-08 LAB
ABO + RH BLD: NORMAL
ALBUMIN SERPL-MCNC: 2.9 G/DL (ref 3.5–5)
ALBUMIN/GLOB SERPL: 0.8 (ref 1.1–2.2)
ALP SERPL-CCNC: 161 U/L (ref 45–117)
ALT SERPL-CCNC: 13 U/L (ref 12–78)
ANION GAP SERPL CALC-SCNC: 7 MMOL/L (ref 2–12)
AST SERPL-CCNC: 20 U/L (ref 15–37)
BASOPHILS # BLD: 0.08 K/UL (ref 0–0.1)
BASOPHILS NFR BLD: 0.6 % (ref 0–1)
BILIRUB SERPL-MCNC: 0.3 MG/DL (ref 0.2–1)
BLOOD GROUP ANTIBODIES SERPL: NORMAL
BUN SERPL-MCNC: 7 MG/DL (ref 6–20)
BUN/CREAT SERPL: 13 (ref 12–20)
CALCIUM SERPL-MCNC: 9 MG/DL (ref 8.5–10.1)
CHLORIDE SERPL-SCNC: 106 MMOL/L (ref 97–108)
CO2 SERPL-SCNC: 22 MMOL/L (ref 21–32)
CREAT SERPL-MCNC: 0.55 MG/DL (ref 0.55–1.02)
DIFFERENTIAL METHOD BLD: ABNORMAL
EOSINOPHIL # BLD: 0.14 K/UL (ref 0–0.4)
EOSINOPHIL NFR BLD: 1.1 % (ref 0–7)
ERYTHROCYTE [DISTWIDTH] IN BLOOD BY AUTOMATED COUNT: 13.8 % (ref 11.5–14.5)
GLOBULIN SER CALC-MCNC: 3.8 G/DL (ref 2–4)
GLUCOSE SERPL-MCNC: 103 MG/DL (ref 65–100)
HCT VFR BLD AUTO: 32.9 % (ref 35–47)
HGB BLD-MCNC: 10.8 G/DL (ref 11.5–16)
IMM GRANULOCYTES # BLD AUTO: 0.16 K/UL (ref 0–0.04)
IMM GRANULOCYTES NFR BLD AUTO: 1.3 % (ref 0–0.5)
LYMPHOCYTES # BLD: 2.64 K/UL (ref 0.8–3.5)
LYMPHOCYTES NFR BLD: 21.2 % (ref 12–49)
MCH RBC QN AUTO: 26.7 PG (ref 26–34)
MCHC RBC AUTO-ENTMCNC: 32.8 G/DL (ref 30–36.5)
MCV RBC AUTO: 81.4 FL (ref 80–99)
MONOCYTES # BLD: 0.75 K/UL (ref 0–1)
MONOCYTES NFR BLD: 6 % (ref 5–13)
NEUTS SEG # BLD: 8.71 K/UL (ref 1.8–8)
NEUTS SEG NFR BLD: 69.8 % (ref 32–75)
NRBC # BLD: 0 K/UL (ref 0–0.01)
NRBC BLD-RTO: 0 PER 100 WBC
PLATELET # BLD AUTO: 252 K/UL (ref 150–400)
PMV BLD AUTO: 10.8 FL (ref 8.9–12.9)
POTASSIUM SERPL-SCNC: 3.8 MMOL/L (ref 3.5–5.1)
PROT SERPL-MCNC: 6.7 G/DL (ref 6.4–8.2)
RBC # BLD AUTO: 4.04 M/UL (ref 3.8–5.2)
SODIUM SERPL-SCNC: 135 MMOL/L (ref 136–145)
SPECIMEN EXP DATE BLD: NORMAL
WBC # BLD AUTO: 12.5 K/UL (ref 3.6–11)

## 2025-01-08 PROCEDURE — 7210000100 HC LABOR FEE PER 1 HR

## 2025-01-08 PROCEDURE — 6370000000 HC RX 637 (ALT 250 FOR IP): Performed by: OBSTETRICS & GYNECOLOGY

## 2025-01-08 PROCEDURE — 86901 BLOOD TYPING SEROLOGIC RH(D): CPT

## 2025-01-08 PROCEDURE — 59200 INSERT CERVICAL DILATOR: CPT

## 2025-01-08 PROCEDURE — 80053 COMPREHEN METABOLIC PANEL: CPT

## 2025-01-08 PROCEDURE — 6360000002 HC RX W HCPCS: Performed by: ADVANCED PRACTICE MIDWIFE

## 2025-01-08 PROCEDURE — 36415 COLL VENOUS BLD VENIPUNCTURE: CPT

## 2025-01-08 PROCEDURE — 1100000000 HC RM PRIVATE

## 2025-01-08 PROCEDURE — 86900 BLOOD TYPING SEROLOGIC ABO: CPT

## 2025-01-08 PROCEDURE — 86850 RBC ANTIBODY SCREEN: CPT

## 2025-01-08 PROCEDURE — 85025 COMPLETE CBC W/AUTO DIFF WBC: CPT

## 2025-01-08 RX ORDER — MAGNESIUM 30 MG
30 TABLET ORAL 2 TIMES DAILY
COMMUNITY

## 2025-01-08 RX ORDER — SODIUM CHLORIDE, SODIUM LACTATE, POTASSIUM CHLORIDE, AND CALCIUM CHLORIDE .6; .31; .03; .02 G/100ML; G/100ML; G/100ML; G/100ML
500 INJECTION, SOLUTION INTRAVENOUS PRN
Status: DISCONTINUED | OUTPATIENT
Start: 2025-01-08 | End: 2025-01-10 | Stop reason: HOSPADM

## 2025-01-08 RX ORDER — TERBUTALINE SULFATE 1 MG/ML
0.25 INJECTION, SOLUTION SUBCUTANEOUS
Status: ACTIVE | OUTPATIENT
Start: 2025-01-08 | End: 2025-01-09

## 2025-01-08 RX ORDER — NALBUPHINE HYDROCHLORIDE 10 MG/ML
10 INJECTION INTRAMUSCULAR; INTRAVENOUS; SUBCUTANEOUS
Status: DISCONTINUED | OUTPATIENT
Start: 2025-01-08 | End: 2025-01-10 | Stop reason: HOSPADM

## 2025-01-08 RX ORDER — DOCUSATE SODIUM 100 MG/1
100 CAPSULE, LIQUID FILLED ORAL 2 TIMES DAILY
Status: DISCONTINUED | OUTPATIENT
Start: 2025-01-08 | End: 2025-01-09 | Stop reason: SDUPTHER

## 2025-01-08 RX ORDER — MISOPROSTOL 200 UG/1
400 TABLET ORAL PRN
Status: DISCONTINUED | OUTPATIENT
Start: 2025-01-08 | End: 2025-01-10 | Stop reason: HOSPADM

## 2025-01-08 RX ORDER — SODIUM CHLORIDE 9 MG/ML
25 INJECTION, SOLUTION INTRAVENOUS PRN
Status: DISCONTINUED | OUTPATIENT
Start: 2025-01-08 | End: 2025-01-10 | Stop reason: HOSPADM

## 2025-01-08 RX ORDER — SODIUM CHLORIDE 0.9 % (FLUSH) 0.9 %
5-40 SYRINGE (ML) INJECTION PRN
Status: DISCONTINUED | OUTPATIENT
Start: 2025-01-08 | End: 2025-01-10 | Stop reason: HOSPADM

## 2025-01-08 RX ORDER — ONDANSETRON 2 MG/ML
4 INJECTION INTRAMUSCULAR; INTRAVENOUS EVERY 6 HOURS PRN
Status: DISCONTINUED | OUTPATIENT
Start: 2025-01-08 | End: 2025-01-09 | Stop reason: SDUPTHER

## 2025-01-08 RX ORDER — URSODIOL 300 MG/1
300 CAPSULE ORAL 3 TIMES DAILY
Status: DISCONTINUED | OUTPATIENT
Start: 2025-01-08 | End: 2025-01-10 | Stop reason: HOSPADM

## 2025-01-08 RX ORDER — SODIUM CHLORIDE 0.9 % (FLUSH) 0.9 %
5-40 SYRINGE (ML) INJECTION EVERY 12 HOURS SCHEDULED
Status: DISCONTINUED | OUTPATIENT
Start: 2025-01-08 | End: 2025-01-10 | Stop reason: HOSPADM

## 2025-01-08 RX ORDER — METHYLERGONOVINE MALEATE 0.2 MG/ML
200 INJECTION INTRAVENOUS PRN
Status: DISCONTINUED | OUTPATIENT
Start: 2025-01-08 | End: 2025-01-10 | Stop reason: HOSPADM

## 2025-01-08 RX ORDER — ONDANSETRON 4 MG/1
4 TABLET, ORALLY DISINTEGRATING ORAL EVERY 6 HOURS PRN
Status: DISCONTINUED | OUTPATIENT
Start: 2025-01-08 | End: 2025-01-09 | Stop reason: SDUPTHER

## 2025-01-08 RX ORDER — CARBOPROST TROMETHAMINE 250 UG/ML
250 INJECTION, SOLUTION INTRAMUSCULAR PRN
Status: DISCONTINUED | OUTPATIENT
Start: 2025-01-08 | End: 2025-01-10 | Stop reason: HOSPADM

## 2025-01-08 RX ORDER — SODIUM CHLORIDE, SODIUM LACTATE, POTASSIUM CHLORIDE, CALCIUM CHLORIDE 600; 310; 30; 20 MG/100ML; MG/100ML; MG/100ML; MG/100ML
INJECTION, SOLUTION INTRAVENOUS CONTINUOUS
Status: DISCONTINUED | OUTPATIENT
Start: 2025-01-08 | End: 2025-01-10 | Stop reason: HOSPADM

## 2025-01-08 RX ORDER — URSODIOL 300 MG/1
300 CAPSULE ORAL
Status: ON HOLD | COMMUNITY
End: 2025-01-10 | Stop reason: HOSPADM

## 2025-01-08 RX ADMIN — NALBUPHINE HYDROCHLORIDE 10 MG: 10 INJECTION, SOLUTION INTRAMUSCULAR; INTRAVENOUS; SUBCUTANEOUS at 22:33

## 2025-01-08 RX ADMIN — Medication 25 MCG: at 20:14

## 2025-01-08 RX ADMIN — URSODIOL 300 MG: 300 CAPSULE ORAL at 22:32

## 2025-01-08 NOTE — PROGRESS NOTES
1/8/2025  6:24 PM  Patient arrived to LDR11 for scheduled induction of labor for Cholestasis. Patient reports positive fetal movement and denies any bleeding or loss of fluild.    1848: Dr Whitley at bedside to see patient and discuss plan of care. SVE 1/50/-3.    2340: Bedside and Verbal shift change report given to Tiffanie (oncoming nurse) by Luis A (offgoing nurse). Report included the following information Nurse Handoff Report, Index, Intake/Output, MAR, Recent Results, and Med Rec Status.

## 2025-01-09 ENCOUNTER — ANESTHESIA (OUTPATIENT)
Facility: HOSPITAL | Age: 40
End: 2025-01-09
Payer: COMMERCIAL

## 2025-01-09 ENCOUNTER — ANESTHESIA EVENT (OUTPATIENT)
Facility: HOSPITAL | Age: 40
End: 2025-01-09
Payer: COMMERCIAL

## 2025-01-09 PROCEDURE — 6370000000 HC RX 637 (ALT 250 FOR IP): Performed by: OBSTETRICS & GYNECOLOGY

## 2025-01-09 PROCEDURE — 3700000025 EPIDURAL BLOCK: Performed by: ANESTHESIOLOGY

## 2025-01-09 PROCEDURE — 7220000101 HC DELIVERY VAGINAL/SINGLE

## 2025-01-09 PROCEDURE — 1120000000 HC RM PRIVATE OB

## 2025-01-09 PROCEDURE — 7100000001 HC PACU RECOVERY - ADDTL 15 MIN

## 2025-01-09 PROCEDURE — 6360000002 HC RX W HCPCS: Performed by: OBSTETRICS & GYNECOLOGY

## 2025-01-09 PROCEDURE — 6360000002 HC RX W HCPCS: Performed by: ANESTHESIOLOGY

## 2025-01-09 PROCEDURE — 0HQ9XZZ REPAIR PERINEUM SKIN, EXTERNAL APPROACH: ICD-10-PCS | Performed by: OBSTETRICS & GYNECOLOGY

## 2025-01-09 PROCEDURE — 2580000003 HC RX 258: Performed by: OBSTETRICS & GYNECOLOGY

## 2025-01-09 PROCEDURE — 7210000100 HC LABOR FEE PER 1 HR

## 2025-01-09 PROCEDURE — 7100000000 HC PACU RECOVERY - FIRST 15 MIN

## 2025-01-09 PROCEDURE — 6360000002 HC RX W HCPCS: Performed by: ADVANCED PRACTICE MIDWIFE

## 2025-01-09 PROCEDURE — 2580000003 HC RX 258: Performed by: ANESTHESIOLOGY

## 2025-01-09 RX ORDER — FENTANYL/BUPIVACAINE/NS/PF 2-1250MCG
12 PLASTIC BAG, INJECTION (ML) INJECTION CONTINUOUS
Status: DISCONTINUED | OUTPATIENT
Start: 2025-01-09 | End: 2025-01-10 | Stop reason: HOSPADM

## 2025-01-09 RX ORDER — FENTANYL CITRATE 50 UG/ML
INJECTION, SOLUTION INTRAMUSCULAR; INTRAVENOUS
Status: COMPLETED
Start: 2025-01-09 | End: 2025-01-09

## 2025-01-09 RX ORDER — SODIUM CHLORIDE 0.9 % (FLUSH) 0.9 %
5-40 SYRINGE (ML) INJECTION EVERY 12 HOURS SCHEDULED
Status: DISCONTINUED | OUTPATIENT
Start: 2025-01-09 | End: 2025-01-10 | Stop reason: HOSPADM

## 2025-01-09 RX ORDER — ONDANSETRON 4 MG/1
4 TABLET, ORALLY DISINTEGRATING ORAL EVERY 6 HOURS PRN
Status: DISCONTINUED | OUTPATIENT
Start: 2025-01-09 | End: 2025-01-10 | Stop reason: HOSPADM

## 2025-01-09 RX ORDER — IBUPROFEN 400 MG/1
800 TABLET, FILM COATED ORAL EVERY 8 HOURS SCHEDULED
Status: DISCONTINUED | OUTPATIENT
Start: 2025-01-09 | End: 2025-01-10 | Stop reason: HOSPADM

## 2025-01-09 RX ORDER — FENTANYL CITRATE 50 UG/ML
INJECTION, SOLUTION INTRAMUSCULAR; INTRAVENOUS
Status: DISCONTINUED | OUTPATIENT
Start: 2025-01-09 | End: 2025-01-09 | Stop reason: SDUPTHER

## 2025-01-09 RX ORDER — BUPIVACAINE HYDROCHLORIDE 2.5 MG/ML
INJECTION, SOLUTION EPIDURAL; INFILTRATION; INTRACAUDAL
Status: DISCONTINUED | OUTPATIENT
Start: 2025-01-09 | End: 2025-01-09

## 2025-01-09 RX ORDER — EPHEDRINE SULFATE 50 MG/ML
10 INJECTION INTRAVENOUS
Status: DISPENSED | OUTPATIENT
Start: 2025-01-09 | End: 2025-01-10

## 2025-01-09 RX ORDER — IBUPROFEN 400 MG/1
800 TABLET, FILM COATED ORAL EVERY 8 HOURS SCHEDULED
Status: DISCONTINUED | OUTPATIENT
Start: 2025-01-09 | End: 2025-01-09

## 2025-01-09 RX ORDER — ONDANSETRON 2 MG/ML
4 INJECTION INTRAMUSCULAR; INTRAVENOUS EVERY 6 HOURS PRN
Status: DISCONTINUED | OUTPATIENT
Start: 2025-01-09 | End: 2025-01-09 | Stop reason: SDUPTHER

## 2025-01-09 RX ORDER — EPHEDRINE SULFATE 50 MG/ML
5 INJECTION INTRAVENOUS PRN
Status: DISCONTINUED | OUTPATIENT
Start: 2025-01-10 | End: 2025-01-10 | Stop reason: HOSPADM

## 2025-01-09 RX ORDER — DIPHENHYDRAMINE HYDROCHLORIDE 50 MG/ML
25 INJECTION INTRAMUSCULAR; INTRAVENOUS ONCE
Status: COMPLETED | OUTPATIENT
Start: 2025-01-09 | End: 2025-01-09

## 2025-01-09 RX ORDER — ACETAMINOPHEN 500 MG
1000 TABLET ORAL EVERY 8 HOURS SCHEDULED
Status: DISCONTINUED | OUTPATIENT
Start: 2025-01-09 | End: 2025-01-10 | Stop reason: HOSPADM

## 2025-01-09 RX ORDER — BUPIVACAINE HYDROCHLORIDE 2.5 MG/ML
INJECTION, SOLUTION EPIDURAL; INFILTRATION; INTRACAUDAL
Status: COMPLETED
Start: 2025-01-09 | End: 2025-01-09

## 2025-01-09 RX ORDER — BUPIVACAINE HYDROCHLORIDE 2.5 MG/ML
INJECTION, SOLUTION EPIDURAL; INFILTRATION; INTRACAUDAL
Status: DISCONTINUED | OUTPATIENT
Start: 2025-01-09 | End: 2025-01-09 | Stop reason: SDUPTHER

## 2025-01-09 RX ORDER — SODIUM CHLORIDE 0.9 % (FLUSH) 0.9 %
5-40 SYRINGE (ML) INJECTION PRN
Status: DISCONTINUED | OUTPATIENT
Start: 2025-01-09 | End: 2025-01-10 | Stop reason: HOSPADM

## 2025-01-09 RX ORDER — MODIFIED LANOLIN
OINTMENT (GRAM) TOPICAL PRN
Status: DISCONTINUED | OUTPATIENT
Start: 2025-01-09 | End: 2025-01-10 | Stop reason: HOSPADM

## 2025-01-09 RX ORDER — ONDANSETRON 2 MG/ML
4 INJECTION INTRAMUSCULAR; INTRAVENOUS EVERY 6 HOURS PRN
Status: DISCONTINUED | OUTPATIENT
Start: 2025-01-09 | End: 2025-01-10 | Stop reason: HOSPADM

## 2025-01-09 RX ORDER — DOCUSATE SODIUM 100 MG/1
100 CAPSULE, LIQUID FILLED ORAL 2 TIMES DAILY
Status: DISCONTINUED | OUTPATIENT
Start: 2025-01-09 | End: 2025-01-10 | Stop reason: HOSPADM

## 2025-01-09 RX ORDER — NALOXONE HYDROCHLORIDE 0.4 MG/ML
INJECTION, SOLUTION INTRAMUSCULAR; INTRAVENOUS; SUBCUTANEOUS PRN
Status: DISCONTINUED | OUTPATIENT
Start: 2025-01-09 | End: 2025-01-10 | Stop reason: HOSPADM

## 2025-01-09 RX ORDER — SODIUM CHLORIDE 9 MG/ML
INJECTION, SOLUTION INTRAVENOUS PRN
Status: DISCONTINUED | OUTPATIENT
Start: 2025-01-09 | End: 2025-01-10 | Stop reason: HOSPADM

## 2025-01-09 RX ADMIN — Medication 25 MCG: at 00:33

## 2025-01-09 RX ADMIN — Medication 25 MCG: at 08:26

## 2025-01-09 RX ADMIN — ACETAMINOPHEN 1000 MG: 500 TABLET ORAL at 23:11

## 2025-01-09 RX ADMIN — BUPIVACAINE HYDROCHLORIDE 3 ML: 2.5 INJECTION, SOLUTION EPIDURAL; INFILTRATION; INTRACAUDAL; PERINEURAL at 13:08

## 2025-01-09 RX ADMIN — BUPIVACAINE HYDROCHLORIDE 3 ML: 2.5 INJECTION, SOLUTION EPIDURAL; INFILTRATION; INTRACAUDAL; PERINEURAL at 13:09

## 2025-01-09 RX ADMIN — IBUPROFEN 800 MG: 400 TABLET, FILM COATED ORAL at 20:08

## 2025-01-09 RX ADMIN — Medication 25 MCG: at 04:35

## 2025-01-09 RX ADMIN — BUPIVACAINE HYDROCHLORIDE 4 ML: 2.5 INJECTION, SOLUTION EPIDURAL; INFILTRATION; INTRACAUDAL; PERINEURAL at 13:10

## 2025-01-09 RX ADMIN — ONDANSETRON 4 MG: 2 INJECTION INTRAMUSCULAR; INTRAVENOUS at 00:48

## 2025-01-09 RX ADMIN — DIPHENHYDRAMINE HYDROCHLORIDE 25 MG: 50 INJECTION INTRAMUSCULAR; INTRAVENOUS at 02:27

## 2025-01-09 RX ADMIN — FENTANYL CITRATE 100 MCG: 50 INJECTION INTRAMUSCULAR; INTRAVENOUS at 13:10

## 2025-01-09 RX ADMIN — DOCUSATE SODIUM 100 MG: 100 CAPSULE, LIQUID FILLED ORAL at 23:09

## 2025-01-09 RX ADMIN — DIPHENHYDRAMINE HYDROCHLORIDE 25 MG: 50 INJECTION INTRAMUSCULAR; INTRAVENOUS at 00:49

## 2025-01-09 RX ADMIN — BUPIVACAINE HYDROCHLORIDE 12 ML/HR: 5 INJECTION, SOLUTION EPIDURAL; INTRACAUDAL; PERINEURAL at 13:25

## 2025-01-09 RX ADMIN — SODIUM CHLORIDE, POTASSIUM CHLORIDE, SODIUM LACTATE AND CALCIUM CHLORIDE: 600; 310; 30; 20 INJECTION, SOLUTION INTRAVENOUS at 13:53

## 2025-01-09 RX ADMIN — Medication 166.7 ML: at 18:21

## 2025-01-09 RX ADMIN — SODIUM CHLORIDE, POTASSIUM CHLORIDE, SODIUM LACTATE AND CALCIUM CHLORIDE 500 ML: 600; 310; 30; 20 INJECTION, SOLUTION INTRAVENOUS at 12:56

## 2025-01-09 RX ADMIN — URSODIOL 300 MG: 300 CAPSULE ORAL at 08:26

## 2025-01-09 ASSESSMENT — PAIN DESCRIPTION - LOCATION: LOCATION: ABDOMEN;PERINEUM

## 2025-01-09 ASSESSMENT — PAIN DESCRIPTION - ORIENTATION: ORIENTATION: LOWER

## 2025-01-09 ASSESSMENT — PAIN - FUNCTIONAL ASSESSMENT: PAIN_FUNCTIONAL_ASSESSMENT: ACTIVITIES ARE NOT PREVENTED

## 2025-01-09 ASSESSMENT — PAIN DESCRIPTION - DESCRIPTORS: DESCRIPTORS: DULL;ACHING

## 2025-01-09 ASSESSMENT — PAIN SCALES - GENERAL: PAINLEVEL_OUTOF10: 3

## 2025-01-09 NOTE — PROGRESS NOTES
Labor Note    S: Feeling more pressure.     O:  BP (!) 104/59   Pulse 61   Temp 98.3 °F (36.8 °C) (Oral)   Resp 18   SpO2 100%     Gen- NAD  Abdomen- soft, gravid, NT  Cervix- 9/100/0  FHTs- cat 1 tracing  [place]- ctx q2 min  S/p AROM.     A/P: 38 y/o  with IUP at 38 2/7 weeks admitted for IOL secondary to ICP. GBS neg.   -Maternal well-being: VSS, epidural for pain  -Fetal well-being: cat 1 tracing, ok for intermittent monitoring while on cytotec  -Labor: s/p 4 doses of cytotec, good response, s/p AROM, now close to delivery!    Anticipate pushing soon.

## 2025-01-09 NOTE — L&D DELIVERY NOTE
BRITTA Burton [[id number]]      Labor Events     Labor: No   Steroids: None  Cervical Ripening Date/Time:  25 20:14:00   Cervical Ripening Type: Misoprostol  Antibiotics Received during Labor: No  Rupture Identifier: Sac 1  Rupture Date/Time:  25 14:58:00   Rupture Type: AROM, Intact  Fluid Color: Clear  Fluid Odor: None  Fluid Volume: Moderate  Induction: AROM  Labor Complications: None       Anesthesia    Method: Epidural       Labor Event Times      Labor onset date/time:        Dilation complete date/time:  25 17:30:00     Start pushing date/time:  2025 17:32:00   Decision date/time (emergent ):            Delivery Details      Delivery Date: 25 Delivery Time: 18:06:00   Delivery Type: Vaginal, Spontaneous              Lamar Presentation    Presentation: Vertex  _: Occiput  _: Anterior       Shoulder Dystocia    Shoulder Dystocia Present?: No       Assisted Delivery Details    Forceps Attempted?: No  Vacuum Extractor Attempted?: No                           Cord    Vessels: 3 Vessels  Complications: None  Delayed Cord Clamping?: Yes  Cord Clamped Date/Time: 2025 18:18:34  Cord Blood Disposition: Discard  Gases Sent?: No              Placenta    Date/Time: 2025 18:21:37  Removal: Spontaneous  Appearance: Intact  Disposition: Discarded       Lacerations    Episiotomy: None  Perineal Lacerations: 1st  Other Lacerations: no non-perineal laceration  Number of Repair Packets: 1       Vaginal Counts    Initial Count Personnel: JIMENA FOX  Initial Count Verified By: RADHA KNOWLES  Intial Sponge Count: Correct Intial Needles Count: Correct Intial Instruments Count: Correct   Final Sponges Count: Correct Final Needles  Count: Correct Final Instruments Count: Correct   Final Count Personnel: DR. RUTLEDGE  Final Count Verified By: JIMENA FOX  Accurate Final Count?: Yes       Blood Loss  Mother: Pam Burton #[id number]     Start of Mother's Information      Delivery

## 2025-01-09 NOTE — PROGRESS NOTES
[facility] Labor Progress Note     Patient: Pam Burton MRN: [id number]  SSN: [id number]    YOB: 1985  Age: 39 y.o.  Sex: female      Care assumed at 2100, late entry due to busy unit    Subjective:   Patient coping well with contractions, just received IV pain medication.  is at bedside.    Objective:   Blood pressure 124/80, pulse 78, temperature 97.6 °F (36.4 °C), temperature source Oral, resp. rate 16, SpO2 100%.  Fetal heart baseline 130, moderate variability, positive accelerations, negative decelerations, Uterine contractions irregular, mild to palpation, resting tone soft, Sterile Vaginal Exam: deferred, fetal presentation vertex, membranes intact     S/p cytotec x 1    Assessment:     38w1d  Category 1 fetal heart rate tracing   IOL for Cholestasis of pregnancy  A Positive  GBS Neg   Hx LEEP  Hx anxiety  AMA  Rubella Immune  Hep B and HIV Neg     Plan:   Introduced self to patient.   Continue with POC as outlined by Dr Whitley: cytotec overnight.   Maternal and fetal monitoring per protocol.  Pain medication PRN  Anticipate     DONNELL Bee - [facility]

## 2025-01-09 NOTE — PROGRESS NOTES
9130 Verbal shift change report given to MAURICE Salas (oncoming nurse) by CHRISTY Mcmahon (offgoing nurse). Report included the following information Nurse Handoff Report, Intake/Output, MAR, and Recent Results.    0233 Bedside and Verbal shift change report given to JIMENA Osborne and KRISTAN Cruz (oncoming nurses) by MAURICE Salas (offgoing nurse). Report included the following information Nurse Handoff Report, Intake/Output, MAR, and Recent Results.

## 2025-01-09 NOTE — PROGRESS NOTES
Progress Note    Pt seen and examined. Pam is well known to me and is a geronimo 38 y/o  with IUP at 38 2/7 weeks admitted for IOL secondary to ICP. Cervix was /-2 and she elected for cytotec overnight.     Cervix now 2-3/70/-2. Will continue cytotec. Plan AROM when amenable.     S: Feeling well overall.     O:  /72   Pulse 76   Temp 98.2 °F (36.8 °C) (Oral)   Resp 17   SpO2 98%     Gen- NAD  Abdomen- soft, gravid, NT  Cervix- 2-3/70/-2  FHTs- cat 1 tracing  [place]- irregular ctx    A/P: 38 y/o  with IUP at 38 2/7 weeks admitted for IOL secondary to ICP. GBS neg.   -Maternal well-being: VSS, epidural when pt desires  -Fetal well-being: cat 1 tracing, ok for intermittent monitoring while on cytotec  -Labor: continue cytotec, will reassess for AROM at next check

## 2025-01-09 NOTE — PROGRESS NOTES
Labor Note    S: Feeling comfortable s/p epidural.     O:  BP (!) 105/57   Pulse 75   Temp 98.3 °F (36.8 °C) (Oral)   Resp 18   SpO2 100%     Gen- NAD  Abdomen- soft, gravid, NT  Cervix- 3-4/80/-2  FHTs- cat 1 tracing  [place]- ctx q2 min  AROM performed with return of copious clear fluid.     A/P: 38 y/o  with IUP at 38 2/7 weeks admitted for IOL secondary to ICP. GBS neg.   -Maternal well-being: VSS, epidural for pain  -Fetal well-being: cat 1 tracing, ok for intermittent monitoring while on cytotec  -Labor: s/p 4 doses of cytotec, good response, s/p AROM, myrtle well currently, will consider starting pitocin if no progress

## 2025-01-09 NOTE — H&P
History & Physical    Name: Pam Burton MRN: [id number]  SSN: [id number]    YOB: 1985  Age: 39 y.o.  Sex: female      Subjective:     Chief Complaint:  Pregnancy and cholestasis     Estimated Date of Delivery: 25  OB History    Para Term  AB Living   2   1     1   SAB IAB Ectopic Molar Multiple Live Births                    # Outcome Date GA Lbr David/2nd Weight Sex Type Anes PTL Lv   1 Current                Ms. Burton is a  admitted with pregnancy at 38w1d for induction of labor due to cholestasis of pregnancy.  She notes itching has improved.  She notes active fetal movement. Prenatal course was otherwise normal.  She has a history of normal vaginal delivery.  Please see prenatal records which have also been sent to Labor and Delivery and added to Epic for details.    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    Postpartum anxiety      Past Surgical History:   Procedure Laterality Date    WISDOM TOOTH EXTRACTION Bilateral      Social History     Occupational History    Not on file   Tobacco Use    Smoking status: Never    Smokeless tobacco: Never   Substance and Sexual Activity    Alcohol use: Not Currently    Drug use: No    Sexual activity: Yes     Partners: Male     Family History   Problem Relation Age of Onset    Hypertension Father        Allergies   Allergen Reactions    Penicillins Anaphylaxis and Rash     Prior to Admission medications    Medication Sig Start Date End Date Taking? Authorizing Provider   ursodiol (ACTIGALL) 300 MG capsule Take 1 capsule by mouth 3 times daily (before meals)   Yes ProviderPapa MD   Docusate Sodium (COLACE PO) Take by mouth   Yes ProviderPapa MD   magnesium 30 MG tablet Take 1

## 2025-01-09 NOTE — PROGRESS NOTES
Labor Note    S: Feeling more pain with ctx.     O:  /67   Pulse 74   Temp 98.3 °F (36.8 °C) (Oral)   Resp 18   SpO2 99%     Gen- NAD  Abdomen- soft, gravid, NT  Cervix- 3-4/80/-2  FHTs- cat 1 tracing  [place]- ctx q2 min    A/P: 40 y/o  with IUP at 38 2/7 weeks admitted for IOL secondary to ICP. GBS neg.   -Maternal well-being: VSS, epidural when pt desires  -Fetal well-being: cat 1 tracing, ok for intermittent monitoring while on cytotec  -Labor: s/p 4 doses of cytotec, good response, plan AROM after epidural

## 2025-01-09 NOTE — ANESTHESIA POSTPROCEDURE EVALUATION
Department of Anesthesiology  Postprocedure Note    Patient: Pam Burton  MRN: [id number]  YOB: 1985  Date of evaluation: 1/9/2025    Procedure Summary       Date: 01/09/25 Room / Location:     Anesthesia Start: 1304 Anesthesia Stop: 1806    Procedure: Labor Analgesia Diagnosis:     Scheduled Providers:  Responsible Provider: Talha Hewitt MD    Anesthesia Type: epidural ASA Status: 2            Anesthesia Type: No value filed.    [place] Phase I:      [place] Phase II:      Anesthesia Post Evaluation    Patient location during evaluation: PACU  Patient participation: complete - patient participated  Level of consciousness: responsive to verbal stimuli and sleepy but conscious  Pain score: 2  Airway patency: patent  Cardiovascular status: blood pressure returned to baseline  Respiratory status: acceptable  Hydration status: stable  Comments: +Post-Anesthesia Evaluation and Assessment    Patient: Pam Burton MRN: [id number]  SSN: [id number]   YOB: 1985  Age: 39 y.o.  Sex: female          Cardiovascular Function/Vital Signs    /73   Pulse 80   Temp 98.2 °F (36.8 °C) (Oral)   Resp 16   SpO2 100%     Patient is status post * No procedures listed *.    Nausea/Vomiting: Controlled.    Postoperative hydration reviewed and adequate.    Pain:      Managed.    Neurological Status:       At baseline.    Mental Status and Level of Consciousness: Arousable.    Pulmonary Status:       Adequate oxygenation and airway patent.    Complications related to anesthesia: None    Post-anesthesia assessment completed. No concerns.    I have evaluated the patient and the patient is stable and ready to be discharged from PACU .    Signed By: Talha Hewitt MD    1/9/2025    Multimodal analgesia pain management approach  Pain management: satisfactory to patient    No notable events documented.

## 2025-01-09 NOTE — PROGRESS NOTES
0730: Bedside and Verbal shift change report given to JOHNSON Osborne RN and TANIYA Cruz RN (oncoming nurse) by PATRICK Salas RN (offgoing nurse). Report included the following information Nurse Handoff Report.      0807: Damien KAT at bedside. EFM reviewed and SVE performed /-2.    1030: Pt okay for intermittent monitoring per Damien KAT. Pt to ambulate hallways.     1157: Pt placed on EFM.     1305: Neva KAT at bedside for epidural placement.     1315: Epidural placed. Pt tolerated well. Positioned on left side.     1340: Pt positioned on right side with peanut ball between legs.     1458: Damien KAT at bedside. EFM reviewed and SVE performed /-2. Pt agreeable to AROM for clear, moderate fluid.     1500: Repositioned pt on left side in flying cowgirl with peanut ball.     1620: Turned patient to right side in exaggerated runners with peanut ball.     1730: Damien KAT at bedside. EFM reviewed and SVE performed 10/100/+1.     1732: Started pushing.    1800\" RN at bedside, continuously monitoring FHR tracing     1806:  vigorous baby boy placed on maternal chest.     1930: Bedside and Verbal shift change report given to Clarisa Khan RN (oncoming nurse) by JOHNSON Osborne RN (offgoing nurse). Report included the following information Nurse Handoff Report.

## 2025-01-09 NOTE — ANESTHESIA PRE PROCEDURE
Department of Anesthesiology  Preprocedure Note       Name:  Pam Burton   Age:  39 y.o.  :  1985                                          MRN:  [id number]         Date:  2025      Surgeon: * No surgeons listed *    Procedure: * No procedures listed *    Medications prior to admission:   Prior to Admission medications    Medication Sig Start Date End Date Taking? Authorizing Provider   ursodiol (ACTIGALL) 300 MG capsule Take 1 capsule by mouth 3 times daily (before meals)   Yes Papa Thomas MD   Docusate Sodium (COLACE PO) Take by mouth   Yes ProviderPapa MD   magnesium 30 MG tablet Take 1 tablet by mouth 2 times daily   Yes ProviderPapa MD   Prenatal Vit-Fe Fumarate-FA (PRENATAL PO) Take by mouth   Yes Papa Thomas MD   ibuprofen (ADVIL;MOTRIN) 600 MG tablet Take 600 mg by mouth every 6 hours as needed 22   Automatic Reconciliation, Ar       Current medications:    Current Facility-Administered Medications   Medication Dose Route Frequency Provider Last Rate Last Admin   • fentaNYL 2 mcg/mL BUPivacaine 0.125% in sodium chloride 0.9% 100 mL epidural infusion  12 mL/hr Epidural Continuous Thanh Hooks MD       • naloxone 0.4 mg in 10 mL sodium chloride syringe   IntraVENous PRN Thanh Hooks MD       • ePHEDrine injection 10 mg  10 mg IntraVENous Once PRN Thanh Hooks MD        Followed by   • [START ON 1/10/2025] ePHEDrine injection 5 mg  5 mg IntraVENous PRN Thanh Hooks MD       • ondansetron (ZOFRAN) injection 4 mg  4 mg IntraVENous Q6H PRN Thanh Hooks MD       • lactated ringers infusion   IntraVENous Continuous Yue Whitley MD       • lactated ringers bolus 500 mL  500 mL IntraVENous PRN Yue Whitley MD        Or   • lactated ringers bolus 500 mL  500 mL IntraVENous PRN Yue Whitley MD       • sodium chloride flush 0.9 % injection 5-40 mL  5-40 mL IntraVENous 2 times per day Yue Whitley MD       • sodium

## 2025-01-09 NOTE — ANESTHESIA PROCEDURE NOTES
Epidural Block    Patient location during procedure: OB  Start time: 1/9/2025 1:04 PM  End time: 1/9/2025 1:12 PM  Reason for block: labor epidural  Staffing  Performed: anesthesiologist   Anesthesiologist: Thanh Hooks MD  Performed by: Thanh Hooks MD  Authorized by: Thanh Hooks MD    Epidural  Patient position: sitting  Prep: DuraPrep  Patient monitoring: continuous pulse ox and frequent blood pressure checks  Approach: midline  Location: L3-4  Injection technique: RON air  Provider prep: mask and sterile gloves  Needle  Needle type: Tuohy   Needle gauge: 18 G  Needle length: 3.5 in  Needle insertion depth: 5 cm  Catheter type: end hole  Catheter size: 20 G  Catheter at skin depth: 9 cm  Test dose: negativeCatheter Secured: tegaderm and tape  Assessment  Hemodynamics: stable  Attempts: 1  Outcomes: uncomplicated and patient tolerated procedure well  Preanesthetic Checklist  Completed: patient identified, IV checked, site marked, risks and benefits discussed, surgical/procedural consents, equipment checked, pre-op evaluation, timeout performed, anesthesia consent given, oxygen available, monitors applied/VS acknowledged, fire risk safety assessment completed and verbalized and blood product R/B/A discussed and consented

## 2025-01-10 VITALS
SYSTOLIC BLOOD PRESSURE: 115 MMHG | RESPIRATION RATE: 16 BRPM | HEART RATE: 75 BPM | OXYGEN SATURATION: 98 % | DIASTOLIC BLOOD PRESSURE: 75 MMHG | TEMPERATURE: 97.9 F

## 2025-01-10 PROBLEM — F41.8 POSTPARTUM ANXIETY: Status: ACTIVE | Noted: 2025-01-10

## 2025-01-10 PROCEDURE — 7210000100 HC LABOR FEE PER 1 HR

## 2025-01-10 PROCEDURE — 6370000000 HC RX 637 (ALT 250 FOR IP): Performed by: OBSTETRICS & GYNECOLOGY

## 2025-01-10 PROCEDURE — 59200 INSERT CERVICAL DILATOR: CPT

## 2025-01-10 RX ORDER — IBUPROFEN 800 MG/1
800 TABLET, FILM COATED ORAL EVERY 8 HOURS PRN
Qty: 60 TABLET | Refills: 0 | Status: SHIPPED | OUTPATIENT
Start: 2025-01-10

## 2025-01-10 RX ORDER — ESCITALOPRAM OXALATE 10 MG/1
10 TABLET ORAL DAILY
Qty: 30 TABLET | Refills: 2 | Status: SHIPPED | OUTPATIENT
Start: 2025-01-10

## 2025-01-10 RX ADMIN — IBUPROFEN 800 MG: 400 TABLET, FILM COATED ORAL at 10:44

## 2025-01-10 RX ADMIN — ACETAMINOPHEN 1000 MG: 500 TABLET ORAL at 05:58

## 2025-01-10 RX ADMIN — ACETAMINOPHEN 1000 MG: 500 TABLET ORAL at 19:33

## 2025-01-10 RX ADMIN — IBUPROFEN 800 MG: 400 TABLET, FILM COATED ORAL at 19:34

## 2025-01-10 RX ADMIN — DOCUSATE SODIUM 100 MG: 100 CAPSULE, LIQUID FILLED ORAL at 19:34

## 2025-01-10 RX ADMIN — IBUPROFEN 800 MG: 400 TABLET, FILM COATED ORAL at 03:09

## 2025-01-10 RX ADMIN — DOCUSATE SODIUM 100 MG: 100 CAPSULE, LIQUID FILLED ORAL at 10:44

## 2025-01-10 ASSESSMENT — PAIN SCALES - GENERAL
PAINLEVEL_OUTOF10: 6
PAINLEVEL_OUTOF10: 5
PAINLEVEL_OUTOF10: 3

## 2025-01-10 ASSESSMENT — PAIN - FUNCTIONAL ASSESSMENT
PAIN_FUNCTIONAL_ASSESSMENT: ACTIVITIES ARE NOT PREVENTED

## 2025-01-10 ASSESSMENT — PAIN DESCRIPTION - DESCRIPTORS
DESCRIPTORS: CRAMPING;DULL
DESCRIPTORS: CRAMPING;SORE
DESCRIPTORS: PRESSURE;ACHING

## 2025-01-10 ASSESSMENT — PAIN DESCRIPTION - ORIENTATION
ORIENTATION: LOWER
ORIENTATION: LOWER
ORIENTATION: ANTERIOR;LOWER

## 2025-01-10 ASSESSMENT — PAIN DESCRIPTION - LOCATION
LOCATION: VAGINA;ABDOMEN
LOCATION: ABDOMEN;PERINEUM
LOCATION: ABDOMEN;VAGINA

## 2025-01-10 NOTE — PROGRESS NOTES
Post-Partum Day Number 1 Progress Note    Pam Bruton     ASSESSMENT/PLAN  Doing well, postpartum day 1, stable, meeting postpartum milestones.    Postpartum anxiety  -Desires to have lexapro on hand in case she needs it. Has discussed with her therapist, who thinks it is a good idea and she has discussed previously with HARIS.   -Reviewed r/b of lexapro as well as possible side effects including initial worsening of sxs prior to improvement, and reviewed to get help emergently if she develops SI/HI. Reviewed that it may take weeks to notice improvement so keep that in mind.   -Will send rx lexapro 10 mg qd on discharge; instructed to initially take 1/2 tablet (so 5 mg) daily for 1 week before increasing to 10 mg qd.   -Plan 2w mood check in office.     Postpartum  - Continue routine postpartum and perineal care as well as maternal education.  - Desires circumcision for baby boy. The risks and benefits of the circumcision procedure and anesthesia including: bleeding, infection, variability of cosmetic results were discussed with the mother. She is aware that future repeat procedures may be necessary. She gives informed consent to proceed as noted and her questions are answered. Desires circ to be done after pictures scheduled at 1:15 pm.    Dispo: Desires early discharge home today pending baby's dispo. Plan follow up at [facility] in 2 weeks for mood check and in 6 weeks for postpartum visit.    Information for the patient's :  BRITTA Burton [[id number]]   Vaginal, Spontaneous     SUBJECTIVE  Patient is doing well. Pain controlled on PO medications. Tolerating general diet without nausea or emesis. Voiding without difficulty. Reports VB is stable. Ambulating OOB without weakness or dizziness. Denies CP, dyspnea or calf pain. Denies other complaints.     Vitals:  /75   Pulse 75   Temp 97.9 °F (36.6 °C) (Oral)   Resp 16   SpO2 98%   Breastfeeding Unknown   Temp (24hrs), Av.2 °F (36.8 °C),

## 2025-01-10 NOTE — DISCHARGE INSTRUCTIONS
with MD: KAN in 2 weeks for postpartum mood check and in 6 weeks for routine postpartum visit.   Telephone number: [phone]    Postpartum Support Groups  We know that all of us are dealing with a tremendous amount of uncertainty, confusion and disruption to our daily lives, which may result in increased anxiety, depression and fear. If you are feeling unsettled or worse, please know that we are here to help. During this time of increased caution and care for one another, [facility] [place] ([facility]) is offering virtual support groups to ALL MOTHERS in [place] regardless of the age of your child/children as a way to help weather this emotional storm together. Social support is an important part of self-care during this time of physical distancing.  Virtual postpartum support group meetings available at www.postpartumva.org  Warm Line: 315.306.7138    Breastfeeding Support Groups   1st and 3rd Wednesday of each month at [place] from 11a-12 2nd and 4th Tuesday of each month at [place] from 10-11a      [email or web address]

## 2025-01-10 NOTE — DISCHARGE SUMMARY
Obstetrical Discharge Summary     Name: Pam Burton MRN: [id number]  SSN: [id number]    YOB: 1985  Age: 39 y.o.  Sex: female      Admit Date: 2025    Discharge Date: 1/10/2025     Admitting Physician: Yue AGEE MD     Attending Physician:  Maribell Quezada MD     Admission Diagnoses: Cholestasis during pregnancy in third trimester [O26.643]    Discharge Diagnoses:   Information for the patient's :  BRITTA Burton [[id number]]   @[phone]@     Additional Diagnoses:  Principal Problem:    Cholestasis during pregnancy in third trimester  Active Problems:    Postpartum anxiety  Resolved Problems:    * No resolved hospital problems. *       Hospital Course: Patient was admitted for IOL for cholestasis. Pregnancy complicated by AMA, hx of leep, anxiety. Intrapartum course was uncomplicated. Proceeded to have uncomplicated  @ 38w2d resulting in viable male infant - 1st deg perineal lac repaired. Please see delivery note for more details. Postpartum course has been uncomplicated. Desires to have lexapro on hand in case she needs help with postpartum anxiety as rec by her therapist and previously discussed with her HARIS. Rx sent. Patient is meeting postpartum milestones.     Patient Instructions:   Current Discharge Medication List        START taking these medications    Details   escitalopram (LEXAPRO) 10 MG tablet Take 1 tablet by mouth daily  Qty: 30 tablet, Refills: 2           CONTINUE these medications which have CHANGED    Details   ibuprofen (ADVIL;MOTRIN) 800 MG tablet Take 1 tablet by mouth every 8 hours as needed for Pain  Qty: 60 tablet, Refills: 0           CONTINUE these medications which have NOT CHANGED    Details   Docusate Sodium (COLACE PO) Take by mouth      magnesium 30 MG tablet Take 1 tablet by mouth 2 times daily      Prenatal Vit-Fe Fumarate-FA (PRENATAL PO) Take by mouth           STOP taking these medications       ursodiol (ACTIGALL) 300 MG capsule

## 2025-01-10 NOTE — LACTATION NOTE
This note was copied from a baby's chart.  . Mother states that she nursed her first baby for 2 years with a good supply. Mother states that she did notice breast changes during this pregnancy. Mother had baby latched during the consult. Audible swallows noted and rhythmic suck observed. Educated mother on feeding cues, feeding on demand, offering both breasts, engorgement care, feeding at least every 3 hours, and doing breast compressions throughout feedings.     Feeding Plan:  Mother will keep baby skin to skin as often as possible, feed on demand, 8-12x/day , respond to feeding cues, obtain latch, listen for audible swallowing, be aware of signs of oxytocin release/ cramping,thirst,sleepiness while breastfeeding, offer both breasts,and will not limit feedings.  Mother agrees to utilize breast massage while nursing to facilitate lactogenesis.

## 2025-09-04 ENCOUNTER — OFFICE VISIT (OUTPATIENT)
Age: 40
End: 2025-09-04
Payer: COMMERCIAL

## 2025-09-04 VITALS
BODY MASS INDEX: 28.2 KG/M2 | SYSTOLIC BLOOD PRESSURE: 98 MMHG | TEMPERATURE: 97.1 F | WEIGHT: 153.25 LBS | OXYGEN SATURATION: 99 % | HEIGHT: 62 IN | HEART RATE: 61 BPM | DIASTOLIC BLOOD PRESSURE: 68 MMHG

## 2025-09-04 DIAGNOSIS — Z00.00 ROUTINE GENERAL MEDICAL EXAMINATION AT A HEALTH CARE FACILITY: Primary | ICD-10-CM

## 2025-09-04 DIAGNOSIS — R73.9 BORDERLINE HYPERGLYCEMIA: ICD-10-CM

## 2025-09-04 DIAGNOSIS — Z13.220 SCREENING, LIPID: ICD-10-CM

## 2025-09-04 PROCEDURE — 99395 PREV VISIT EST AGE 18-39: CPT | Performed by: INTERNAL MEDICINE

## 2025-09-04 ASSESSMENT — PATIENT HEALTH QUESTIONNAIRE - PHQ9
1. LITTLE INTEREST OR PLEASURE IN DOING THINGS: NOT AT ALL
SUM OF ALL RESPONSES TO PHQ QUESTIONS 1-9: 0
SUM OF ALL RESPONSES TO PHQ QUESTIONS 1-9: 0
2. FEELING DOWN, DEPRESSED OR HOPELESS: NOT AT ALL
SUM OF ALL RESPONSES TO PHQ QUESTIONS 1-9: 0
SUM OF ALL RESPONSES TO PHQ QUESTIONS 1-9: 0

## 2025-09-05 ENCOUNTER — RESULTS FOLLOW-UP (OUTPATIENT)
Age: 40
End: 2025-09-05

## 2025-09-05 LAB
ALBUMIN SERPL-MCNC: 4 G/DL (ref 3.5–5.2)
ALBUMIN/GLOB SERPL: 1.4 (ref 1.1–2.2)
ALP SERPL-CCNC: 93 U/L (ref 35–104)
ALT SERPL-CCNC: 16 U/L (ref 10–35)
ANION GAP SERPL CALC-SCNC: 12 MMOL/L (ref 2–14)
AST SERPL-CCNC: 21 U/L (ref 10–35)
BASOPHILS # BLD: 0.09 K/UL (ref 0–0.1)
BASOPHILS NFR BLD: 1.5 % (ref 0–1)
BILIRUB SERPL-MCNC: 0.3 MG/DL (ref 0–1.2)
BUN SERPL-MCNC: 14 MG/DL (ref 6–20)
BUN/CREAT SERPL: 18 (ref 12–20)
CALCIUM SERPL-MCNC: 9.9 MG/DL (ref 8.6–10)
CHLORIDE SERPL-SCNC: 100 MMOL/L (ref 98–107)
CHOLEST SERPL-MCNC: 189 MG/DL (ref 0–200)
CO2 SERPL-SCNC: 26 MMOL/L (ref 20–29)
CREAT SERPL-MCNC: 0.78 MG/DL (ref 0.6–1)
DIFFERENTIAL METHOD BLD: ABNORMAL
EOSINOPHIL # BLD: 0.17 K/UL (ref 0–0.4)
EOSINOPHIL NFR BLD: 2.8 % (ref 0–7)
ERYTHROCYTE [DISTWIDTH] IN BLOOD BY AUTOMATED COUNT: 12.7 % (ref 11.5–14.5)
EST. AVERAGE GLUCOSE BLD GHB EST-MCNC: 105 MG/DL
GLOBULIN SER CALC-MCNC: 2.8 G/DL (ref 2–4)
GLUCOSE SERPL-MCNC: 92 MG/DL (ref 65–100)
HBA1C MFR BLD: 5.3 % (ref 4–5.6)
HCT VFR BLD AUTO: 38.6 % (ref 35–47)
HDLC SERPL-MCNC: 87 MG/DL (ref 40–60)
HDLC SERPL: 2.2 (ref 0–5)
HGB BLD-MCNC: 12.8 G/DL (ref 11.5–16)
IMM GRANULOCYTES # BLD AUTO: 0.01 K/UL (ref 0–0.04)
IMM GRANULOCYTES NFR BLD AUTO: 0.2 % (ref 0–0.5)
LDLC SERPL CALC-MCNC: 88 MG/DL (ref 0–100)
LYMPHOCYTES # BLD: 1.8 K/UL (ref 0.8–3.5)
LYMPHOCYTES NFR BLD: 29.6 % (ref 12–49)
MCH RBC QN AUTO: 30.5 PG (ref 26–34)
MCHC RBC AUTO-ENTMCNC: 33.2 G/DL (ref 30–36.5)
MCV RBC AUTO: 91.9 FL (ref 80–99)
MONOCYTES # BLD: 0.62 K/UL (ref 0–1)
MONOCYTES NFR BLD: 10.2 % (ref 5–13)
NEUTS SEG # BLD: 3.4 K/UL (ref 1.8–8)
NEUTS SEG NFR BLD: 55.7 % (ref 32–75)
NRBC # BLD: 0 K/UL (ref 0–0.01)
NRBC BLD-RTO: 0 PER 100 WBC
PLATELET # BLD AUTO: 323 K/UL (ref 150–400)
PMV BLD AUTO: 11.6 FL (ref 8.9–12.9)
POTASSIUM SERPL-SCNC: 4.6 MMOL/L (ref 3.5–5.1)
PROT SERPL-MCNC: 6.8 G/DL (ref 6.4–8.3)
RBC # BLD AUTO: 4.2 M/UL (ref 3.8–5.2)
SODIUM SERPL-SCNC: 137 MMOL/L (ref 136–145)
TRIGL SERPL-MCNC: 73 MG/DL (ref 0–150)
VLDLC SERPL CALC-MCNC: 15 MG/DL
WBC # BLD AUTO: 6.1 K/UL (ref 3.6–11)